# Patient Record
Sex: FEMALE | Race: WHITE | NOT HISPANIC OR LATINO | Employment: FULL TIME | URBAN - METROPOLITAN AREA
[De-identification: names, ages, dates, MRNs, and addresses within clinical notes are randomized per-mention and may not be internally consistent; named-entity substitution may affect disease eponyms.]

---

## 2023-08-15 ENCOUNTER — APPOINTMENT (OUTPATIENT)
Dept: PRIMARY CARE | Facility: CLINIC | Age: 33
End: 2023-08-15
Payer: COMMERCIAL

## 2023-11-29 ENCOUNTER — APPOINTMENT (OUTPATIENT)
Dept: DERMATOLOGY | Facility: CLINIC | Age: 33
End: 2023-11-29
Payer: COMMERCIAL

## 2023-12-16 DIAGNOSIS — K21.9 GASTROESOPHAGEAL REFLUX DISEASE WITHOUT ESOPHAGITIS: Primary | ICD-10-CM

## 2023-12-20 RX ORDER — PANTOPRAZOLE SODIUM 40 MG/1
40 TABLET, DELAYED RELEASE ORAL DAILY
Qty: 30 TABLET | Refills: 1 | Status: SHIPPED | OUTPATIENT
Start: 2023-12-20 | End: 2024-01-25 | Stop reason: SDUPTHER

## 2024-01-10 NOTE — PROGRESS NOTES
Subjective     HPI  Ms. Antonio is a 34 year old female who presents for follow up for PCOS and an elevated prolactin level.    She currently has an URTI from 12/20.  She is currently on an antibiotic.       The patient sates that menarche was in the 8th grade. She cannot recall if menses were irregular from the onset, but she does state that they where irregular in high school and college. She can go for up to three months without a menstrual bleed.      She admits to having hirsutism. She can remove hair daily. She describes it as being black and coarse. She denies any voice changes.     She has been ahving acne, worse prior to her period.       Her sister has the MTHFR gene mutation.       LMP 1/5; menses are regular      Current Regimen  Cabergoline 0.25mg once weekly on Saturdays      She had an MRI in October 2020 which revealed a partially empty sella.         Symptoms are as listed below:  Weight - lost 8 pounds in one week via dietary modifications and row machine; she just purchased a treadmill  Energy - fatigued due to lack of exercise and poor diet  Sleep - difficulty chronically; hard to fall asleep; frequent awakenings.  Temperature intolerances - denies   Bowel movements - has IBS-D; sees GI on 1/24  Skin changes -chronic acne; she has rosacea; sees Dermatology   Hair changes - getting more grey hair    Headaches - admits due to computer screen all day   Vision - denies changes; last eye appt was last week   Cramps - denies   Mood - denies changes        Objective   None given nature of visit today     Assessment/Plan   34 -year-old female presents for follow up for PCOS and an elevated prolactin level.     Elevated prolactin level  To obtain blood tests 2-3 weeks after resolution of current upper respiratory tract infection  To continue Cabergoline 0.25mg po once weekly     Empty sella (CMS/HCC)  To obtain pituitary panel     PCOS (polycystic ovarian syndrome)  To obtain free and total testosterone  values     For follow up in 12 months

## 2024-01-10 NOTE — PATIENT INSTRUCTIONS
Thank you for choosing [facility]  for your health care needs.  If you have any questions, concerns or medical needs, please feel free to contact our office at [phone].    Please ensure you complete your blood work one week before the next scheduled appointment.    To obtain blood tests 2-3 weeks after resolution of current upper respiratory tract infection  To continue 0.25mg once weekly   Will follow up with results   For follow up in 12 months

## 2024-01-11 ENCOUNTER — TELEMEDICINE (OUTPATIENT)
Dept: ENDOCRINOLOGY | Facility: CLINIC | Age: 34
End: 2024-01-11
Payer: COMMERCIAL

## 2024-01-11 VITALS — BODY MASS INDEX: 40.58 KG/M2 | HEIGHT: 69 IN | WEIGHT: 274 LBS

## 2024-01-11 DIAGNOSIS — R79.89 ELEVATED PROLACTIN LEVEL: Primary | ICD-10-CM

## 2024-01-11 DIAGNOSIS — E28.2 POLYCYSTIC OVARIAN SYNDROME: ICD-10-CM

## 2024-01-11 DIAGNOSIS — E23.6 EMPTY SELLA (MULTI): ICD-10-CM

## 2024-01-11 PROCEDURE — 99442 PR PHYS/QHP TELEPHONE EVALUATION 11-20 MIN: CPT | Performed by: INTERNAL MEDICINE

## 2024-01-11 RX ORDER — AMOXICILLIN 250 MG/1
CAPSULE ORAL
COMMUNITY
End: 2024-01-25 | Stop reason: WASHOUT

## 2024-01-11 RX ORDER — CABERGOLINE 0.5 MG/1
TABLET ORAL
COMMUNITY

## 2024-01-15 PROBLEM — E23.6 EMPTY SELLA (MULTI): Status: ACTIVE | Noted: 2024-01-15

## 2024-01-15 PROBLEM — E28.2 PCOS (POLYCYSTIC OVARIAN SYNDROME): Status: ACTIVE | Noted: 2024-01-15

## 2024-01-15 PROBLEM — R79.89 ELEVATED PROLACTIN LEVEL: Status: ACTIVE | Noted: 2024-01-15

## 2024-01-15 NOTE — ASSESSMENT & PLAN NOTE
To obtain blood tests 2-3 weeks after resolution of current upper respiratory tract infection  To continue Cabergoline 0.25mg po once weekly

## 2024-01-25 ENCOUNTER — OFFICE VISIT (OUTPATIENT)
Dept: GASTROENTEROLOGY | Facility: CLINIC | Age: 34
End: 2024-01-25
Payer: COMMERCIAL

## 2024-01-25 VITALS
WEIGHT: 280.2 LBS | DIASTOLIC BLOOD PRESSURE: 98 MMHG | BODY MASS INDEX: 41.5 KG/M2 | OXYGEN SATURATION: 98 % | HEIGHT: 69 IN | HEART RATE: 69 BPM | SYSTOLIC BLOOD PRESSURE: 142 MMHG

## 2024-01-25 DIAGNOSIS — R11.0 NAUSEA: ICD-10-CM

## 2024-01-25 DIAGNOSIS — K21.9 GASTROESOPHAGEAL REFLUX DISEASE WITHOUT ESOPHAGITIS: ICD-10-CM

## 2024-01-25 DIAGNOSIS — R19.7 DIARRHEA, UNSPECIFIED TYPE: Primary | ICD-10-CM

## 2024-01-25 PROCEDURE — 99214 OFFICE O/P EST MOD 30 MIN: CPT | Performed by: NURSE PRACTITIONER

## 2024-01-25 PROCEDURE — 1036F TOBACCO NON-USER: CPT | Performed by: NURSE PRACTITIONER

## 2024-01-25 RX ORDER — LOPERAMIDE HYDROCHLORIDE 2 MG/1
2 CAPSULE ORAL 4 TIMES DAILY PRN
Qty: 60 CAPSULE | Refills: 1 | Status: SHIPPED | OUTPATIENT
Start: 2024-01-25

## 2024-01-25 RX ORDER — BISMUTH SUBSALICYLATE 262 MG
1 TABLET,CHEWABLE ORAL DAILY
COMMUNITY

## 2024-01-25 RX ORDER — NAPROXEN 375 MG/1
375 TABLET ORAL 2 TIMES DAILY
COMMUNITY
Start: 2021-09-16

## 2024-01-25 RX ORDER — PANTOPRAZOLE SODIUM 40 MG/1
40 TABLET, DELAYED RELEASE ORAL DAILY
Qty: 30 TABLET | Refills: 2 | Status: SHIPPED | OUTPATIENT
Start: 2024-01-25 | End: 2024-05-22

## 2024-01-25 RX ORDER — ONDANSETRON 4 MG/1
4 TABLET, FILM COATED ORAL EVERY 8 HOURS PRN
Qty: 15 TABLET | Refills: 1 | Status: SHIPPED | OUTPATIENT
Start: 2024-01-25

## 2024-01-25 RX ORDER — DICYCLOMINE HYDROCHLORIDE 10 MG/1
10 CAPSULE ORAL AS NEEDED
Qty: 60 CAPSULE | Refills: 1 | Status: SHIPPED | OUTPATIENT
Start: 2024-01-25

## 2024-01-25 ASSESSMENT — ENCOUNTER SYMPTOMS
FEVER: 0
COUGH: 0
DIZZINESS: 0
PALPITATIONS: 0
SHORTNESS OF BREATH: 0
CHILLS: 0
SORE THROAT: 0
TROUBLE SWALLOWING: 0
DIARRHEA: 1
BRUISES/BLEEDS EASILY: 0
WEAKNESS: 0
ROS GI COMMENTS: SEE HPI
NAUSEA: 1
JOINT SWELLING: 0
ADENOPATHY: 0
CONFUSION: 0
ARTHRALGIAS: 0
DIFFICULTY URINATING: 0
WOUND: 0

## 2024-01-25 NOTE — PROGRESS NOTES
Subjective   Patient ID: Nazia Antonio is a 34 y.o. female with PMH of GERD, IBS, obesity, and PCOS who presents for Follow-up (Medication refill).     Patient's PCP is MARY Jiang-HANH     HPI    Patient initially seen in May 2021 for GERD and diarrhea. Cdiff and stool PCR negative. Pancreatic elastase, TTG, CRP all WNL. EGD and colonoscopy on 6/25/2021 showed mild gastritis but were otherwise normal. Colonoscopy 6/25/2021 showed a normal colon; random biopsies negative for colitis. Reflux improved with pantoprazole 40mg daily.     She continues to have diarrhea about 5 times per day. She has tried watching her diet and avoids fatty foods but it does not seem to help. No particular triggering foods. No blood in stool and no melena. She does get nausea sometimes and has some abdominal pain/cramping. She otherwise denies unintended weight loss, vomiting, dysphagia, or constipation.     Reflux is well controlled with pantoprazole 40mg daily. She would like refills.       Summary of endoscopies:  - EGD 6/2021: normal esophagus, mild gastritis, normal duodenum. Duodenal biopsies negative for histopathologic change.   - Colonoscopy 6/2021: normal colon; random biopsies negative for colitis.     Social Hx:  Tobacco: none  Etoh: socially   Recreational drug use: occasional marijuana  NSAIDs: ibuprofen once a week       Family Hx:  maternal aunt -- crohns disease   No GI malignancy or pancreatitis     Review of Systems:  Review of Systems   Constitutional:  Negative for chills and fever.   HENT:  Negative for sore throat and trouble swallowing.    Respiratory:  Negative for cough and shortness of breath.    Cardiovascular:  Negative for chest pain and palpitations.   Gastrointestinal:  Positive for diarrhea and nausea.        SEE HPI   Endocrine: Negative for cold intolerance and heat intolerance.   Genitourinary:  Negative for difficulty urinating.   Musculoskeletal:  Negative for arthralgias and joint  swelling.   Skin:  Negative for rash and wound.   Neurological:  Negative for dizziness and weakness.   Hematological:  Negative for adenopathy. Does not bruise/bleed easily.   Psychiatric/Behavioral:  Negative for confusion.         Medications:  Prior to Admission medications    Medication Sig Start Date End Date Taking? Authorizing Provider   cabergoline (Dostinex) 0.5 mg tablet TAKE 1/2 TABLET BY MOUTH ONCE A WEEK AS DIRECTED   Yes Historical Provider, MD   naproxen (Naprosyn) 375 mg tablet Take 1 tablet (375 mg) by mouth 2 times a day. 9/16/21  Yes Historical Provider, MD   pantoprazole (ProtoNix) 40 mg EC tablet TAKE ONE TABLET BY MOUTH EVERY DAY 12/20/23  Yes MARY Blanco-CNP   multivitamin tablet Take 1 tablet by mouth once daily.    Historical Provider, MD   amoxicillin (Amoxil) 250 mg capsule Take by mouth.  1/25/24  Historical Provider, MD       Allergies:  Patient has no known allergies.    Past Medical History:  She has a past medical history of GERD (gastroesophageal reflux disease), Irritable bowel syndrome, and Other conditions influencing health status.    Past Surgical History:  She has a past surgical history that includes Other surgical history (08/18/2020); Other surgical history (04/12/2021); and Other surgical history (09/25/2020).    Social History:  She reports that she has never smoked. She has never been exposed to tobacco smoke. She has never used smokeless tobacco. She reports that she does not currently use alcohol. She reports that she does not use drugs.    Objective   Physical exam:  Physical Exam  Constitutional:       General: She is not in acute distress.     Appearance: Normal appearance.   HENT:      Mouth/Throat:      Mouth: Mucous membranes are moist.      Comments: pink  Eyes:      Conjunctiva/sclera: Conjunctivae normal.      Pupils: Pupils are equal, round, and reactive to light.   Cardiovascular:      Rate and Rhythm: Normal rate and regular rhythm.      Heart  sounds: No murmur heard.  Pulmonary:      Effort: Pulmonary effort is normal.      Breath sounds: Normal breath sounds.   Abdominal:      General: Bowel sounds are normal. There is no distension.      Palpations: Abdomen is soft.      Tenderness: There is no abdominal tenderness. There is no guarding.   Skin:     General: Skin is warm and dry.      Coloration: Skin is not jaundiced.   Neurological:      Mental Status: She is alert and oriented to person, place, and time.   Psychiatric:         Mood and Affect: Mood normal.         Behavior: Behavior normal.          Assessment/Plan     Reflux   Well controlled with pantoprazole 40mg daily. Have sent refills. Does have some nausea at times; zofran sent.     2. Diarrhea  Ongoing for years. 5 BMs per day. Lab work and colonoscopy have previously been unremarkable. Will send for hydrogen breath test to check for SIBO. Sent imodium, dicyclomine for symptom management. Will likely plan for xifaxan.          Kerrie Stinson, APRN-CNP

## 2024-01-25 NOTE — PATIENT INSTRUCTIONS
Thank you for coming to your appoinment today   - I have refilled the pantoprazole   - I sent zofran to use as needed for nausea  - I sent Loperamide to use for diarrhea  - I sent Dicyclomine to use for cramping   - Schedule your hydrogen breath test; call [phone]    Please call [phone] with any questions or concerns       If you utilize [employer] messages, please understand that these are intended to be used for simple and straightforward medical questions. If you have a more complex question or numerous complaints, an office appointment may be needed.

## 2024-01-29 ENCOUNTER — TELEPHONE (OUTPATIENT)
Dept: ENDOCRINOLOGY | Facility: CLINIC | Age: 34
End: 2024-01-29
Payer: COMMERCIAL

## 2024-01-29 NOTE — TELEPHONE ENCOUNTER
"Patient called with c/o weight gain and requests lab orders. Patient has been advised of existing orders but wants more. She was not able to provide any specific testing. She states she would like to have lab orders for \"things that may affect weight loss and her hormones\". Please advise.  "

## 2024-01-30 ENCOUNTER — OFFICE VISIT (OUTPATIENT)
Dept: PRIMARY CARE | Facility: CLINIC | Age: 34
End: 2024-01-30
Payer: COMMERCIAL

## 2024-01-30 VITALS
BODY MASS INDEX: 40.88 KG/M2 | WEIGHT: 276 LBS | DIASTOLIC BLOOD PRESSURE: 76 MMHG | RESPIRATION RATE: 14 BRPM | HEART RATE: 68 BPM | SYSTOLIC BLOOD PRESSURE: 124 MMHG | HEIGHT: 69 IN

## 2024-01-30 DIAGNOSIS — Z00.00 ROUTINE GENERAL MEDICAL EXAMINATION AT A HEALTH CARE FACILITY: Primary | ICD-10-CM

## 2024-01-30 PROBLEM — K21.9 GERD (GASTROESOPHAGEAL REFLUX DISEASE): Status: ACTIVE | Noted: 2024-01-30

## 2024-01-30 PROBLEM — L71.9 ROSACEA, UNSPECIFIED: Status: ACTIVE | Noted: 2022-06-08

## 2024-01-30 PROBLEM — E66.01 MORBID OBESITY (MULTI): Status: ACTIVE | Noted: 2019-06-26

## 2024-01-30 PROBLEM — K58.0 IRRITABLE BOWEL SYNDROME WITH DIARRHEA: Status: ACTIVE | Noted: 2024-01-30

## 2024-01-30 PROBLEM — N92.6 IRREGULAR MENSES: Status: ACTIVE | Noted: 2024-01-30

## 2024-01-30 PROCEDURE — 99395 PREV VISIT EST AGE 18-39: CPT | Performed by: FAMILY MEDICINE

## 2024-01-30 PROCEDURE — 1036F TOBACCO NON-USER: CPT | Performed by: FAMILY MEDICINE

## 2024-01-30 NOTE — PROGRESS NOTES
Subjective   Patient ID: Nazia Antonio is a 34 y.o. female who presents for No chief complaint on file..    HPI     Review of Systems    Objective   There were no vitals taken for this visit.    Physical Exam    Assessment/Plan   {Assess/PlanSmartLinks:81357}

## 2024-01-30 NOTE — PROGRESS NOTES
"Subjective   Patient ID: Nazia Antonio is a 34 y.o. female who presents for No chief complaint on file..    HPI     Review of Systems    Objective   /76   Pulse 68   Resp 14   Ht 1.753 m (5' 9\")   Wt 125 kg (276 lb)   BMI 40.76 kg/m²     Physical Exam    Assessment/Plan   {Assess/PlanSmartLinks:68269}       "

## 2024-01-30 NOTE — PROGRESS NOTES
pt has regular dental visits. no vision problems. no hearing loss.   Lifestyle: healthy diet. + weight concerns. Pt exercises regularly. no tobacco or alcohol abuse.   Safety elements used: seat belt, safe driving habits and smoke detector.   No passive smoke exposure, chemical abuse, domestic violence, anxiety symptoms, depression symptoms.  Pt has safe sexual behavior, safe driving habits. No driving violations, history of DUI.  No tuberculosis exposure.   Reproductive health: the patient is premenopausal. she reports normal menses. she uses no contraception. she is sexually active.   Cervical cancer screening:. patient has no history of an abnormal pap smear.   Review of Systems  Constitutional: no chills, no fever and no night sweats.   Eyes: no blurred vision and no eyesight problems.   ENT: no hearing loss, no nasal congestion, no nasal discharge, no hoarseness and no sore throat.   Neck: no mass(es) and no swelling.   Cardiovascular: no chest pain, no intermittent leg claudication, no lower extremity edema, no palpitations and no syncope.   Respiratory: no cough, no shortness of breath during exertion, no shortness of breath at rest and no wheezing.   Gastrointestinal: no abdominal pain, no blood in stools, no constipation, + diarrhea, no melena, no nausea, no rectal pain and no vomiting.   Genitourinary: no unexplained vaginal bleeding, no dysuria, no change in urinary frequency, no genital lesions, no hematuria, no urinary hesitancy, no incontinence, no pelvic pain, no feelings of urinary urgency and no vaginal discharge.   Musculoskeletal: no arthralgias, no back pain, no localized joint pain, no myalgias and no neck pain.   Integumentary: no new skin lesions, no nipple discharge, no rashes and no skin wound.   Neurological: no confusion, no convulsions, no difficulty walking, no headache, no limb weakness, no memory changes, no numbness, no speech difficulties, no syncope and no tingling. rsl at  night  Psychiatric: no anxiety, no personality change, no depression, no emotional problems, no homicidal thoughts, no anhedonia, no sleep disturbances and no substance use disorders.   Endocrine: no changes in appetite, no deepening of the voice, no polyuria, no feelings of weakness, no heat/cold intolerance, no muscle weakness, no polydipsia, no recent weight gain and no recent weight loss.   Hematologic/Lymphatic: no tendency for easy bleeding, no tendency for easy bruising, no recurrent infections and no swollen glands.     Physical Exam  Constitutional: Alert and in no acute distress. Well developed, well nourished.   Head and Face: Head and face: Normal.  Palpation of the face and sinuses: Normal.    Eyes: Normal external exam. Pupils: PERRL with normal accommodation and EOMI.   Ears, Nose, Mouth, and Throat: External inspection of ears and nose: Normal.  Hearing: Normal.  Nasal mucosa, septum, and turbinates: Normal.  Oropharynx: Normal.    Neck: No neck mass was observed. Supple. Thyroid not enlarged and there were no palpable thyroid nodules.   Cardiovascular: Heart rate and rhythm were normal, normal S1 and S2, no gallops, no murmurs and no pericardial rub. Pedal pulses: Normal. No peripheral edema.   Pulmonary: No respiratory distress. Clear bilateral breath sounds.   Chest wall: Normal.    Abdomen: Soft nontender; no abdominal mass palpated. No organomegaly. No hernias.   Musculoskeletal: Gait and station: Normal. No joint swelling seen, normal movements of all extremities. Range of motion: Normal.  Muscle strength/tone: Normal.    Skin: Normal skin color and pigmentation, normal skin turgor, and no rash. Palpation of skin and subcutaneous tissue: Normal.    Neurologic: Cranial nerves 2-12 grossly intact. Deep tendon reflexes were 2+ and symmetric at the knees. Sensation: Normal. Coordination: Normal.    Psychiatric: Judgment and insight: Intact. Alert and oriented x 3. Recent and remote memory: Normal.   Mood and affect: Normal.   Lymphatic: No cervical lymphadenopathy. Palpation of lymph nodes in axillae: Normal.      unremarkable PE except morbid obesity. recommend nutritionist eval. Recommend DASH diet and regular exercise. check CBC, CMP, lipid, TSH.  Advise eye exam by an OD yearly and dental exam every 6 months. will monitor lipid and weight yearly.  recommend Hep C, hepB, HIV test. recommend   Tdap, hepB, flu, covid shot. We will call pt regarding lab results. f/u as scheduled.  Recommend  pap smear. Pt prefers to see her Gynecologist for evaluation.

## 2024-01-31 ENCOUNTER — LAB (OUTPATIENT)
Dept: LAB | Facility: LAB | Age: 34
End: 2024-01-31
Payer: COMMERCIAL

## 2024-01-31 DIAGNOSIS — E28.2 POLYCYSTIC OVARIAN SYNDROME: ICD-10-CM

## 2024-01-31 DIAGNOSIS — E23.6 EMPTY SELLA (MULTI): ICD-10-CM

## 2024-01-31 DIAGNOSIS — Z00.00 ROUTINE GENERAL MEDICAL EXAMINATION AT A HEALTH CARE FACILITY: ICD-10-CM

## 2024-01-31 DIAGNOSIS — R79.89 ELEVATED PROLACTIN LEVEL: ICD-10-CM

## 2024-01-31 LAB
ALBUMIN SERPL BCP-MCNC: 4.3 G/DL (ref 3.4–5)
ALP SERPL-CCNC: 52 U/L (ref 33–110)
ALT SERPL W P-5'-P-CCNC: 44 U/L (ref 7–45)
AMORPH CRY #/AREA UR COMP ASSIST: ABNORMAL /HPF
ANION GAP SERPL CALC-SCNC: 14 MMOL/L (ref 10–20)
APPEARANCE UR: ABNORMAL
AST SERPL W P-5'-P-CCNC: 34 U/L (ref 9–39)
BILIRUB SERPL-MCNC: 0.7 MG/DL (ref 0–1.2)
BILIRUB UR STRIP.AUTO-MCNC: NEGATIVE MG/DL
BUN SERPL-MCNC: 14 MG/DL (ref 6–23)
CALCIUM SERPL-MCNC: 9 MG/DL (ref 8.6–10.3)
CHLORIDE SERPL-SCNC: 105 MMOL/L (ref 98–107)
CHOLEST SERPL-MCNC: 167 MG/DL (ref 0–199)
CHOLESTEROL/HDL RATIO: 3.7
CO2 SERPL-SCNC: 25 MMOL/L (ref 21–32)
COLOR UR: YELLOW
CORTIS SERPL-MCNC: 26.3 UG/DL (ref 2.5–20)
CREAT SERPL-MCNC: 0.92 MG/DL (ref 0.5–1.05)
DHEA-S SERPL-MCNC: 263 UG/DL (ref 12–379)
EGFRCR SERPLBLD CKD-EPI 2021: 83 ML/MIN/1.73M*2
ERYTHROCYTE [DISTWIDTH] IN BLOOD BY AUTOMATED COUNT: 12.9 % (ref 11.5–14.5)
EST. AVERAGE GLUCOSE BLD GHB EST-MCNC: 100 MG/DL
FSH SERPL-ACNC: 3.9 IU/L
GLUCOSE SERPL-MCNC: 82 MG/DL (ref 74–99)
GLUCOSE UR STRIP.AUTO-MCNC: NEGATIVE MG/DL
HBA1C MFR BLD: 5.1 %
HCT VFR BLD AUTO: 41.9 % (ref 36–46)
HDLC SERPL-MCNC: 45.1 MG/DL
HGB BLD-MCNC: 13.9 G/DL (ref 12–16)
KETONES UR STRIP.AUTO-MCNC: NEGATIVE MG/DL
LDLC SERPL CALC-MCNC: 102 MG/DL
LEUKOCYTE ESTERASE UR QL STRIP.AUTO: ABNORMAL
LH SERPL-ACNC: 10.1 IU/L
MCH RBC QN AUTO: 30.2 PG (ref 26–34)
MCHC RBC AUTO-ENTMCNC: 33.2 G/DL (ref 32–36)
MCV RBC AUTO: 91 FL (ref 80–100)
MUCOUS THREADS #/AREA URNS AUTO: ABNORMAL /LPF
NITRITE UR QL STRIP.AUTO: NEGATIVE
NON HDL CHOLESTEROL: 122 MG/DL (ref 0–149)
NRBC BLD-RTO: 0 /100 WBCS (ref 0–0)
PH UR STRIP.AUTO: 5 [PH]
PLATELET # BLD AUTO: 249 X10*3/UL (ref 150–450)
POTASSIUM SERPL-SCNC: 4.3 MMOL/L (ref 3.5–5.3)
PROLACTIN SERPL-MCNC: 5.1 UG/L (ref 3–20)
PROT SERPL-MCNC: 7.5 G/DL (ref 6.4–8.2)
PROT UR STRIP.AUTO-MCNC: NEGATIVE MG/DL
RBC # BLD AUTO: 4.6 X10*6/UL (ref 4–5.2)
RBC # UR STRIP.AUTO: NEGATIVE /UL
RBC #/AREA URNS AUTO: ABNORMAL /HPF
SODIUM SERPL-SCNC: 140 MMOL/L (ref 136–145)
SP GR UR STRIP.AUTO: 1.02
SQUAMOUS #/AREA URNS AUTO: ABNORMAL /HPF
TRIGL SERPL-MCNC: 102 MG/DL (ref 0–149)
TSH SERPL-ACNC: 2.14 MIU/L (ref 0.44–3.98)
UROBILINOGEN UR STRIP.AUTO-MCNC: <2 MG/DL
VLDL: 20 MG/DL (ref 0–40)
WBC # BLD AUTO: 7.1 X10*3/UL (ref 4.4–11.3)
WBC #/AREA URNS AUTO: ABNORMAL /HPF

## 2024-01-31 PROCEDURE — 83036 HEMOGLOBIN GLYCOSYLATED A1C: CPT

## 2024-01-31 PROCEDURE — 80061 LIPID PANEL: CPT

## 2024-01-31 PROCEDURE — 84402 ASSAY OF FREE TESTOSTERONE: CPT

## 2024-01-31 PROCEDURE — 83001 ASSAY OF GONADOTROPIN (FSH): CPT

## 2024-01-31 PROCEDURE — 84305 ASSAY OF SOMATOMEDIN: CPT

## 2024-01-31 PROCEDURE — 82681 ASSAY DIR MEAS FR ESTRADIOL: CPT

## 2024-01-31 PROCEDURE — 85027 COMPLETE CBC AUTOMATED: CPT

## 2024-01-31 PROCEDURE — 81001 URINALYSIS AUTO W/SCOPE: CPT

## 2024-01-31 PROCEDURE — 83002 ASSAY OF GONADOTROPIN (LH): CPT

## 2024-01-31 PROCEDURE — 82533 TOTAL CORTISOL: CPT

## 2024-01-31 PROCEDURE — 80053 COMPREHEN METABOLIC PANEL: CPT

## 2024-01-31 PROCEDURE — 82670 ASSAY OF TOTAL ESTRADIOL: CPT

## 2024-01-31 PROCEDURE — 82024 ASSAY OF ACTH: CPT

## 2024-01-31 PROCEDURE — 36415 COLL VENOUS BLD VENIPUNCTURE: CPT

## 2024-01-31 PROCEDURE — 84443 ASSAY THYROID STIM HORMONE: CPT

## 2024-01-31 PROCEDURE — 84146 ASSAY OF PROLACTIN: CPT

## 2024-01-31 PROCEDURE — 82627 DEHYDROEPIANDROSTERONE: CPT

## 2024-02-01 LAB — ACTH PLAS-MCNC: 82.3 PG/ML (ref 7.2–63.3)

## 2024-02-02 LAB
IGF-I SERPL-MCNC: 109 NG/ML (ref 81–278)
IGF-I Z-SCORE SERPL: -1.3

## 2024-02-04 LAB
TESTOSTERONE FREE (CHAN): 4.1 PG/ML (ref 0.1–6.4)
TESTOSTERONE,TOTAL,LC-MS/MS: 22 NG/DL (ref 2–45)

## 2024-02-07 ENCOUNTER — OFFICE VISIT (OUTPATIENT)
Dept: PRIMARY CARE | Facility: CLINIC | Age: 34
End: 2024-02-07
Payer: COMMERCIAL

## 2024-02-07 DIAGNOSIS — R31.21 ASYMPTOMATIC MICROSCOPIC HEMATURIA: Primary | ICD-10-CM

## 2024-02-07 PROCEDURE — 99213 OFFICE O/P EST LOW 20 MIN: CPT | Performed by: FAMILY MEDICINE

## 2024-02-07 PROCEDURE — 1036F TOBACCO NON-USER: CPT | Performed by: FAMILY MEDICINE

## 2024-02-07 NOTE — PROGRESS NOTES
pt denies  low abd tenderness, dysuria, urinary frequency and urgency, hematuria, flank pain, fever or chills. No nausea, vomiting. Recent UA showed blood and wbc.   Will check urine cx for eval

## 2024-02-08 ENCOUNTER — TELEPHONE (OUTPATIENT)
Dept: ENDOCRINOLOGY | Facility: CLINIC | Age: 34
End: 2024-02-08
Payer: COMMERCIAL

## 2024-02-13 NOTE — TELEPHONE ENCOUNTER
Patient called again to check status of labs. She also sent my chart message. She is concerned of elevated levels and would like to know Dr. English' recommendations as soon as possible. She expressed her dissatisfaction that it has been two weeks with no response. Please advise.

## 2024-02-14 ENCOUNTER — LAB (OUTPATIENT)
Dept: LAB | Facility: LAB | Age: 34
End: 2024-02-14
Payer: COMMERCIAL

## 2024-02-14 DIAGNOSIS — R79.89 ELEVATED CORTISOL LEVEL: Primary | ICD-10-CM

## 2024-02-14 DIAGNOSIS — R31.21 ASYMPTOMATIC MICROSCOPIC HEMATURIA: ICD-10-CM

## 2024-02-14 PROCEDURE — 87086 URINE CULTURE/COLONY COUNT: CPT

## 2024-02-14 NOTE — RESULT ENCOUNTER NOTE
Labs that have been resulted have been reviewed; two values are still in process.  All values are within normal limits with the exception of the ACTH and cortisol values.  There is no suppression of the DHEAS values to suggest Cushing's.  However, please undergo 24 hour urinary collection to assess further.  The first urine of the day is flushed and then all urine is placed in the container which can be obtained from the lab.  The urine must be kept cold in this period of 24 hour period.

## 2024-02-15 DIAGNOSIS — R31.21 ASYMPTOMATIC MICROSCOPIC HEMATURIA: Primary | ICD-10-CM

## 2024-02-15 LAB — BACTERIA UR CULT: NORMAL

## 2024-02-16 ENCOUNTER — LAB (OUTPATIENT)
Dept: LAB | Facility: LAB | Age: 34
End: 2024-02-16
Payer: COMMERCIAL

## 2024-02-16 DIAGNOSIS — R79.89 ELEVATED CORTISOL LEVEL: ICD-10-CM

## 2024-02-16 PROCEDURE — 81050 URINALYSIS VOLUME MEASURE: CPT

## 2024-02-16 PROCEDURE — 82570 ASSAY OF URINE CREATININE: CPT

## 2024-02-16 PROCEDURE — 82530 CORTISOL FREE: CPT

## 2024-02-17 LAB
COLLECT DURATION TIME SPEC: 24 HRS
CREAT 24H UR-MCNC: 67.6 MG/DL (ref 20–320)
CREAT 24H UR-MRATE: 2.03 G/24 H (ref 0.67–1.59)
SPECIMEN VOL 24H UR: 3000 ML

## 2024-02-20 LAB
ANNOTATION COMMENT IMP: ABNORMAL
COLLECT DURATION TIME SPEC: 24 HR
CORTIS F 24H UR HPLC-MCNC: 3.96 UG/L
CORTIS F 24H UR-MRATE: 11.9 UG/D
CORTIS F/CREAT 24H UR: 5.82 UG/G CRT
CREAT 24H UR-MRATE: 2040 MG/D (ref 700–1600)
CREAT UR-MCNC: 68 MG/DL
SPECIMEN VOL ?TM UR: 3000 ML

## 2024-02-23 ENCOUNTER — PROCEDURE VISIT (OUTPATIENT)
Dept: GASTROENTEROLOGY | Facility: CLINIC | Age: 34
End: 2024-02-23
Payer: COMMERCIAL

## 2024-02-23 DIAGNOSIS — R19.7 DIARRHEA, UNSPECIFIED TYPE: ICD-10-CM

## 2024-02-23 PROCEDURE — 91065 BREATH HYDROGEN/METHANE TEST: CPT | Performed by: NURSE PRACTITIONER

## 2024-02-23 NOTE — PROGRESS NOTES
Patient ID: Nazia Antonio is a 34 y.o. female.    Breath Hydrogen Test-Bacterial Overgrowth    Date/Time: 2/23/2024 10:23 AM    Performed by: Mary Brooks RN  Authorized by: JEFFERSON Blanco    Universal protocol:     Patient identity confirmed:  Verbally with patient  Sedation:     Sedation type:  None  Post-procedure details:     Procedure completion:  Tolerated  Hydrogen Breath Analysis Consultation Sheet    Referring Provider: JEFFERSON Blanco  [address] [facility] [address]  [place] [address]    Indication: Diarrhea    Age: 34 y.o.  Weight: There were no vitals filed for this visit.  Substrate: 75 GRAMS DEXTROSE    Last Meal: 1915  Recent Antibiotics: Denies    RESULTS:   Time PPM (H2) APPM* (CH4) CO2 Correction   Baseline #1 0835 2 1 4.9 1.12   Baseline #2 0840 2 1 5.0 1.10   *Challenge Dose Sugar: 0845  15' 0900 7 3 5.3 1.03   30' 0915 6 3 5.3 1.03   45' 0930 5 4 5.9 0.94   60' 0945 4 4 5.4 1.03   75' 1000 3 1 5.5 1.00   90' 1015 2 1 5.0 1.10   105'        120'        135'        150'        165'        180'          Impression: Negative for SIBO and methane

## 2024-02-25 LAB
ESTRADIOL (SJC): 146 PG/ML
ESTRADIOL FREE: NORMAL PG/ML

## 2024-02-26 NOTE — RESULT ENCOUNTER NOTE
Labs have been reviewed.  There is no evidence of cortisol excess on urinary testing.  For follow up as scheduled.

## 2024-02-28 ENCOUNTER — OFFICE VISIT (OUTPATIENT)
Dept: UROLOGY | Facility: CLINIC | Age: 34
End: 2024-02-28
Payer: COMMERCIAL

## 2024-02-28 VITALS — DIASTOLIC BLOOD PRESSURE: 78 MMHG | SYSTOLIC BLOOD PRESSURE: 124 MMHG | HEART RATE: 86 BPM

## 2024-02-28 DIAGNOSIS — R31.29 MICROHEMATURIA: Primary | ICD-10-CM

## 2024-02-28 LAB
POC BILIRUBIN, URINE: NEGATIVE
POC BLOOD, URINE: NEGATIVE
POC GLUCOSE, URINE: NEGATIVE MG/DL
POC KETONES, URINE: NEGATIVE MG/DL
POC LEUKOCYTES, URINE: ABNORMAL
POC NITRITE,URINE: NEGATIVE
POC PH, URINE: 5.5 PH
POC PROTEIN, URINE: NEGATIVE MG/DL
POC SPECIFIC GRAVITY, URINE: 1.02
POC UROBILINOGEN, URINE: 0.2 EU/DL

## 2024-02-28 PROCEDURE — 51798 US URINE CAPACITY MEASURE: CPT

## 2024-02-28 PROCEDURE — 81003 URINALYSIS AUTO W/O SCOPE: CPT

## 2024-02-28 PROCEDURE — 1036F TOBACCO NON-USER: CPT

## 2024-02-28 PROCEDURE — 99204 OFFICE O/P NEW MOD 45 MIN: CPT

## 2024-02-28 PROCEDURE — 2000F BLOOD PRESSURE MEASURE: CPT

## 2024-02-28 NOTE — LETTER
2024     Luis Maya MD PhD  [id number] [facility] [address]    Patient: Nazia Antonio   YOB: 1990   Date of Visit: 2024       Dear Dr. Luis Maya MD PhD:    Thank you for referring Nazia Antonio to me for evaluation. Below are my notes for this consultation.  If you have questions, please do not hesitate to call me. I look forward to following your patient along with you.       Sincerely,     Anabel Mcfarland PA-C      CC: No Recipients  ______________________________________________________________________________________      [facility] [place]  Outpatient Clinic Note    Patient: Nazia Antonio  Age/Sex: 34 y.o., female  MRN: [id number]  Referred by: Dr. Luis Maya    Chief Complaint:  Blood in urine         History of Present Illness  This is a 34 y.o. female,  presenting to the office as a new patient for microhematuria found by her PCP on microscopic urine lab , RBC 11-20 and WBC 21-50. Urine culture  negative for bacterial growth. Her endocrinologist ordered a 24 hour urine Creatinine resulting in elevated levels of 2.03, and cortisol urine resulted in Creatinine elevated as well . The patient denies gross hematuria, dysuria, flank pain, abdominal pain, nausea, vomiting, fever or chills. The patient denies a history of kidney stones or any urological history. The patient reports she has IBS and follows with with Kerrie Stinson CNP. She reports her LMP was 2024 she used to have irregular periods and was diagnosed with PCOS, her endocrinologist prescribed a medication to help her periods be regular. Ovarian cyst removed in  by a physician in [place]. She denies a history of any cancer. She denies ever using tobacco.     Gyn History:  - Menopausal: No           Postmenopausal bleeding: No  - Hysterectomy: No  - Pap up to date: No   History of abnormal pap: No  - Sexually active:  Yes  Dyspareunia: No   Other issues:   - Number of prior  vaginal deliveries: 0  - Number of prior c-sections: 0    - Colonoscopy up to date: Yes - 06/2021      Past Medical & Surgical History  Past Medical History:   Diagnosis Date   • GERD (gastroesophageal reflux disease)    • Irritable bowel syndrome    • Other conditions influencing health status     No significant past medical history     Past Surgical History:   Procedure Laterality Date   • OTHER SURGICAL HISTORY  08/18/2020    Knee surgery   • OTHER SURGICAL HISTORY  04/12/2021    Finger surgical procedure   • OTHER SURGICAL HISTORY  09/25/2020    Cystoscopy       Family History  Family History   Problem Relation Name Age of Onset   • Alcohol abuse Father Akhil    • Cirrhosis Father Akhil    • Cirrhosis Mother's Brother Akhil    • Crohn's disease Mother's Sister Federica        Social History  She reports that she has never smoked. She has never been exposed to tobacco smoke. She has never used smokeless tobacco. She reports that she does not currently use alcohol. She reports that she does not use drugs.    Allergies  Patient has no known allergies.    Medications:  Current Outpatient Medications on File Prior to Visit   Medication Sig Dispense Refill   • cabergoline (Dostinex) 0.5 mg tablet TAKE 1/2 TABLET BY MOUTH ONCE A WEEK AS DIRECTED     • dicyclomine (Bentyl) 10 mg capsule Take 1 capsule (10 mg) by mouth if needed (4 times per day as needed for cramping). Take 1 tablet 4 times per day as needed for cramping 60 capsule 1   • loperamide (Anti-DiarrheaL, loperamide,) 2 mg capsule Take 1 capsule (2 mg) by mouth 4 times a day as needed for diarrhea. 60 capsule 1   • multivitamin tablet Take 1 tablet by mouth once daily.     • naproxen (Naprosyn) 375 mg tablet Take 1 tablet (375 mg) by mouth 2 times a day.     • ondansetron (Zofran) 4 mg tablet Take 1 tablet (4 mg) by mouth every 8 hours if needed for nausea or vomiting. 15 tablet 1   • pantoprazole (ProtoNix) 40 mg EC tablet Take 1 tablet (40 mg) by mouth once  daily. 30 tablet 2     No current facility-administered medications on file prior to visit.        Review of Systems   A comprehensive 10+ review of systems was negative except for: see hpi          Physical Exam                                                                                                                      General: Well developed, well nourished, alert and cooperative, appears in no acute distress  Head: Normocephalic, atraumatic  Neck: supple, trachea midline  Eyes: Non-injected conjunctiva, sclera clear, no proptosis  Cardiac: Extremities are warm and well perfused. No edema, cyanosis or pallor.   Lungs: Breathing is easy, non-labored. Speaking in clear and complete sentences. Normal diaphragmatic movement.  Abdomen: soft, non-distended, non-tender, no rebound or guarding, no hernia and no CVA tenderness   MSK: Ambulatory with steady gait, unassisted  Neuro: alert and oriented to person, place and time  Psych: Demonstrates good judgement and reason, without hallucinations, abnormal affect or abnormal behaviors.  Skin: no obvious lesions, no rashes      PVR (by Ultrasound): 34mL   Urine dip:   Recent Results (from the past 6 hour(s))   POCT UA Automated manually resulted    Collection Time: 02/28/24  8:57 AM   Result Value Ref Range    POC Glucose, Urine NEGATIVE NEGATIVE mg/dl    POC Bilirubin, Urine NEGATIVE NEGATIVE    POC Ketones, Urine NEGATIVE NEGATIVE mg/dl    POC Specific Gravity, Urine 1.025 1.005 - 1.035    POC Blood, Urine NEGATIVE NEGATIVE    POC PH, Urine 5.5 No Reference Range Established PH    POC Protein, Urine NEGATIVE NEGATIVE, 30 (1+) mg/dl    POC Urobilinogen, Urine 0.2 0.2, 1.0 EU/DL    Poc Nitrite, Urine NEGATIVE NEGATIVE    POC Leukocytes, Urine SMALL (1+) (A) NEGATIVE       Labs  Urine Culture  Order: [id number]  Collected 2/14/2024 14:39       Status: Final result       Visible to patient: Yes (seen)       Dx: Asymptomatic microscopic hematuria    Specimen Information:  Clean Catch/Voided; Urine   0 Result Notes       1 Patient Communication  Urine Culture No significant growth           Resulting Agency: [facility]           Specimen Collected: 24 14:39 Last Resulted: 02/15/24 19:41            abnormal data Microscopic Only, Urine  Order: [id number] - Reflex for Order [id number]  Status: Final result       Visible to patient: Yes (seen)       Dx: Routine general medical examination a...    1 Result Note      Component  Ref Range & Units 4 wk ago   WBC, Urine  1-5, NONE /HPF 21-50 Abnormal    RBC, Urine  NONE, 1-2, 3-5 /HPF 11-20 Abnormal    Squamous Epithelial Cells, Urine  Reference range not established. /HPF 1-9 (SPARSE)   Mucus, Urine  Reference range not established. /LPF 4+   Amorphous Crystals, Urine  NONE, 1+, 2+ /HPF 1+   Resulting Agency [facility]        abnormal data Creatinine, urine, 24 hour  Order: [id number]  Status: Final result       Visible to patient: Yes (seen)       Dx: Elevated cortisol level    1 Result Note       1 Patient Communication   important suggestion  Newer results are available. Click to view them now.         Component  Ref Range & Units 12 d ago   Collection period  hrs 24   Urine Volume  mL 3,000   Creatinine, Urine  20.0 - 320.0 mg/dL 67.6   Creatinine, 24 Hour Urine  0.67 - 1.59 g/24 h 2.03 High    Resulting Agency [facility]              Specimen Collected: 24 13:18 Last Resulted: 24 03:58           Imaging  N/A    IMPRESSION AND PLAN:  Nazia Antonio is a 34 y.o.  presenting to the office as a new patient for microhematuria found by her PCP on microscopic urine lab , RBC 11-20 and WBC 21-50. Urine culture  negative for bacterial growth. Her endocrinologist ordered a 24 hour urine Creatinine resulting in elevated levels of 2.03, and cortisol urine resulted in Creatinine elevated as well . The patient denies gross hematuria, dysuria, flank pain, abdominal pain, nausea, vomiting, fever or chills. The patient denies a  history of kidney stones or any urological history. The patient reports she has IBS and follows with with Kerrie Stinson CNP. She reports her LMP was 1/7/2024 she used to have irregular periods and was diagnosed with PCOS, her endocrinologist prescribed a medication to help her periods be regular. Ovarian cyst removed in 2007 by a physician in [place]. She denies a history of any cancer. She denies ever using tobacco.     Microhematuria  -Microscopic urine lab 1/31, RBC 11-20 and WBC 21-50.  -Urine culture 2/14 negative for bacterial growth.  -24 hour urine Creatinine resulting in elevated levels of 2.03, and cortisol urine resulted in Creatinine elevated as well at 2040.  -Discussion with Dr. Harrison- ordering a renal ultrasound   -Cystoscopy scheduled with Dr. Harrison    I will call patient with results of the Renal ultrasound.     Cystoscopy scheduled with Dr. Harrison for  3/22/2024    All questions and concerns were answered and addressed.  The patient expressed understanding and agrees with the plan.     Reviewed and approved by PATTI MADDEN on 2/28/24 at 9:01 AM.

## 2024-02-28 NOTE — PROGRESS NOTES
[facility] [place]  Outpatient Clinic Note    Patient: Nazia Antonio  Age/Sex: 34 y.o., female  MRN: [id number]  Referred by: Dr. Luis Maya    Chief Complaint:  Blood in urine         History of Present Illness  This is a 34 y.o. female,  presenting to the office as a new patient for microhematuria found by her PCP on microscopic urine lab , RBC 11-20 and WBC 21-50. Urine culture  negative for bacterial growth. Her endocrinologist ordered a 24 hour urine Creatinine resulting in elevated levels of 2.03, and cortisol urine resulted in Creatinine elevated as well . The patient denies gross hematuria, dysuria, flank pain, abdominal pain, nausea, vomiting, fever or chills. The patient denies a history of kidney stones or any urological history. The patient reports she has IBS and follows with with Kerrie Stinson CNP. She reports her LMP was 2024 she used to have irregular periods and was diagnosed with PCOS, her endocrinologist prescribed a medication to help her periods be regular. Ovarian cyst removed in  by a physician in [place]. She denies a history of any cancer. She denies ever using tobacco.     Gyn History:  - Menopausal: No           Postmenopausal bleeding: No  - Hysterectomy: No  - Pap up to date: No   History of abnormal pap: No  - Sexually active:  Yes  Dyspareunia: No   Other issues:   - Number of prior vaginal deliveries: 0  - Number of prior c-sections: 0    - Colonoscopy up to date: Yes - 2021      Past Medical & Surgical History  Past Medical History:   Diagnosis Date    GERD (gastroesophageal reflux disease)     Irritable bowel syndrome     Other conditions influencing health status     No significant past medical history     Past Surgical History:   Procedure Laterality Date    OTHER SURGICAL HISTORY  2020    Knee surgery    OTHER SURGICAL HISTORY  2021    Finger surgical procedure    OTHER SURGICAL HISTORY  2020    Cystoscopy       Family  History  Family History   Problem Relation Name Age of Onset    Alcohol abuse Father Akhil     Cirrhosis Father Akhil     Cirrhosis Mother's Brother Akhil     Crohn's disease Mother's Sister Federica        Social History  She reports that she has never smoked. She has never been exposed to tobacco smoke. She has never used smokeless tobacco. She reports that she does not currently use alcohol. She reports that she does not use drugs.    Allergies  Patient has no known allergies.    Medications:  Current Outpatient Medications on File Prior to Visit   Medication Sig Dispense Refill    cabergoline (Dostinex) 0.5 mg tablet TAKE 1/2 TABLET BY MOUTH ONCE A WEEK AS DIRECTED      dicyclomine (Bentyl) 10 mg capsule Take 1 capsule (10 mg) by mouth if needed (4 times per day as needed for cramping). Take 1 tablet 4 times per day as needed for cramping 60 capsule 1    loperamide (Anti-DiarrheaL, loperamide,) 2 mg capsule Take 1 capsule (2 mg) by mouth 4 times a day as needed for diarrhea. 60 capsule 1    multivitamin tablet Take 1 tablet by mouth once daily.      naproxen (Naprosyn) 375 mg tablet Take 1 tablet (375 mg) by mouth 2 times a day.      ondansetron (Zofran) 4 mg tablet Take 1 tablet (4 mg) by mouth every 8 hours if needed for nausea or vomiting. 15 tablet 1    pantoprazole (ProtoNix) 40 mg EC tablet Take 1 tablet (40 mg) by mouth once daily. 30 tablet 2     No current facility-administered medications on file prior to visit.        Review of Systems   A comprehensive 10+ review of systems was negative except for: see hpi          Physical Exam                                                                                                                      General: Well developed, well nourished, alert and cooperative, appears in no acute distress  Head: Normocephalic, atraumatic  Neck: supple, trachea midline  Eyes: Non-injected conjunctiva, sclera clear, no proptosis  Cardiac: Extremities are warm and well  perfused. No edema, cyanosis or pallor.   Lungs: Breathing is easy, non-labored. Speaking in clear and complete sentences. Normal diaphragmatic movement.  Abdomen: soft, non-distended, non-tender, no rebound or guarding, no hernia and no CVA tenderness   MSK: Ambulatory with steady gait, unassisted  Neuro: alert and oriented to person, place and time  Psych: Demonstrates good judgement and reason, without hallucinations, abnormal affect or abnormal behaviors.  Skin: no obvious lesions, no rashes      PVR (by Ultrasound): 34mL   Urine dip:   Recent Results (from the past 6 hour(s))   POCT UA Automated manually resulted    Collection Time: 02/28/24  8:57 AM   Result Value Ref Range    POC Glucose, Urine NEGATIVE NEGATIVE mg/dl    POC Bilirubin, Urine NEGATIVE NEGATIVE    POC Ketones, Urine NEGATIVE NEGATIVE mg/dl    POC Specific Gravity, Urine 1.025 1.005 - 1.035    POC Blood, Urine NEGATIVE NEGATIVE    POC PH, Urine 5.5 No Reference Range Established PH    POC Protein, Urine NEGATIVE NEGATIVE, 30 (1+) mg/dl    POC Urobilinogen, Urine 0.2 0.2, 1.0 EU/DL    Poc Nitrite, Urine NEGATIVE NEGATIVE    POC Leukocytes, Urine SMALL (1+) (A) NEGATIVE       Labs  Urine Culture  Order: [id number]  Collected 2/14/2024 14:39       Status: Final result       Visible to patient: Yes (seen)       Dx: Asymptomatic microscopic hematuria    Specimen Information: Clean Catch/Voided; Urine   0 Result Notes       1 Patient Communication  Urine Culture No significant growth           Resulting Agency: [facility]           Specimen Collected: 02/14/24 14:39 Last Resulted: 02/15/24 19:41            abnormal data Microscopic Only, Urine  Order: [id number] - Reflex for Order [id number]  Status: Final result       Visible to patient: Yes (seen)       Dx: Routine general medical examination a...    1 Result Note      Component  Ref Range & Units 4 wk ago   WBC, Urine  1-5, NONE /HPF 21-50 Abnormal    RBC, Urine  NONE, 1-2, 3-5 /HPF 11-20 Abnormal     Squamous Epithelial Cells, Urine  Reference range not established. /HPF 1-9 (SPARSE)   Mucus, Urine  Reference range not established. /LPF 4+   Amorphous Crystals, Urine  NONE, 1+, 2+ /HPF 1+   Resulting Agency [facility]        abnormal data Creatinine, urine, 24 hour  Order: [id number]  Status: Final result       Visible to patient: Yes (seen)       Dx: Elevated cortisol level    1 Result Note       1 Patient Communication   important suggestion  Newer results are available. Click to view them now.         Component  Ref Range & Units 12 d ago   Collection period  hrs 24   Urine Volume  mL 3,000   Creatinine, Urine  20.0 - 320.0 mg/dL 67.6   Creatinine, 24 Hour Urine  0.67 - 1.59 g/24 h 2.03 High    Resulting Agency [facility]              Specimen Collected: 24 13:18 Last Resulted: 24 03:58           Imaging  N/A    IMPRESSION AND PLAN:  Nazia Antonio is a 34 y.o.  presenting to the office as a new patient for microhematuria found by her PCP on microscopic urine lab , RBC 11-20 and WBC 21-50. Urine culture  negative for bacterial growth. Her endocrinologist ordered a 24 hour urine Creatinine resulting in elevated levels of 2.03, and cortisol urine resulted in Creatinine elevated as well . The patient denies gross hematuria, dysuria, flank pain, abdominal pain, nausea, vomiting, fever or chills. The patient denies a history of kidney stones or any urological history. The patient reports she has IBS and follows with with Kerrie Stinson CNP. She reports her LMP was 2024 she used to have irregular periods and was diagnosed with PCOS, her endocrinologist prescribed a medication to help her periods be regular. Ovarian cyst removed in  by a physician in [place]. She denies a history of any cancer. She denies ever using tobacco.     Microhematuria  -Microscopic urine lab , RBC 11-20 and WBC 21-50.  -Urine culture  negative for bacterial growth.  -24 hour urine Creatinine  resulting in elevated levels of 2.03, and cortisol urine resulted in Creatinine elevated as well at 2040.  -Discussion with Dr. Harrison- ordering a renal ultrasound   -Cystoscopy scheduled with Dr. Harrison    I will call patient with results of the Renal ultrasound.     Cystoscopy scheduled with Dr. Harrison for  3/22/2024    All questions and concerns were answered and addressed.  The patient expressed understanding and agrees with the plan.     Reviewed and approved by PATTI MADDEN on 2/28/24 at 9:01 AM.

## 2024-03-01 ENCOUNTER — HOSPITAL ENCOUNTER (OUTPATIENT)
Dept: RADIOLOGY | Facility: HOSPITAL | Age: 34
Discharge: HOME | End: 2024-03-01
Payer: COMMERCIAL

## 2024-03-01 DIAGNOSIS — R31.29 MICROHEMATURIA: ICD-10-CM

## 2024-03-01 PROCEDURE — 76770 US EXAM ABDO BACK WALL COMP: CPT | Performed by: STUDENT IN AN ORGANIZED HEALTH CARE EDUCATION/TRAINING PROGRAM

## 2024-03-01 PROCEDURE — 76770 US EXAM ABDO BACK WALL COMP: CPT

## 2024-03-04 ENCOUNTER — TELEPHONE (OUTPATIENT)
Dept: ENDOCRINOLOGY | Facility: CLINIC | Age: 34
End: 2024-03-04
Payer: COMMERCIAL

## 2024-03-04 NOTE — TELEPHONE ENCOUNTER
"Patient had viewed her lab results/recommendations on 2/26/24.  She does still have questions on the results and would like a call back to discuss this.  She specifically wants to discuss the lab tests that were showing as \"abnormal\".  "

## 2024-03-04 NOTE — TELEPHONE ENCOUNTER
----- Message from Nazia Antonio sent at 3/4/2024 10:55 AM EST -----  Regarding: Test results   Contact: [phone]  Yes why is those labs high? What’s are the cause of it? What are my next steps? She replied with no explanation no direction of next steps

## 2024-03-04 NOTE — TELEPHONE ENCOUNTER
Patient has been called.  Explained the pulsatile nature of blood tests as it pertains to the cortisol and ACTH and hence the reason why she was asked to do the 24 hour urinary collection.  Original labs were obtained due to history of empty sella. Explained that the 24 hour urinary collection is diagnostic for a state of cortisol excess.    The 24 hour urinary collection was normal.  The elevated creatinine reflects the high volume of urine that was collected. There was no evidence of cortisol excess.      A urology consult was not placed by me for this purpose.      Understand expressed

## 2024-03-22 ENCOUNTER — APPOINTMENT (OUTPATIENT)
Dept: UROLOGY | Facility: CLINIC | Age: 34
End: 2024-03-22
Payer: COMMERCIAL

## 2024-03-26 ENCOUNTER — APPOINTMENT (OUTPATIENT)
Dept: GASTROENTEROLOGY | Facility: CLINIC | Age: 34
End: 2024-03-26
Payer: COMMERCIAL

## 2024-04-04 ENCOUNTER — APPOINTMENT (OUTPATIENT)
Dept: UROLOGY | Facility: CLINIC | Age: 34
End: 2024-04-04
Payer: COMMERCIAL

## 2024-04-26 ENCOUNTER — PROCEDURE VISIT (OUTPATIENT)
Dept: UROLOGY | Facility: CLINIC | Age: 34
End: 2024-04-26
Payer: COMMERCIAL

## 2024-04-26 DIAGNOSIS — R31.29 MICROHEMATURIA: ICD-10-CM

## 2024-04-26 LAB
POC BILIRUBIN, URINE: NEGATIVE
POC BLOOD, URINE: ABNORMAL
POC GLUCOSE, URINE: NEGATIVE MG/DL
POC KETONES, URINE: NEGATIVE MG/DL
POC LEUKOCYTES, URINE: ABNORMAL
POC NITRITE,URINE: NEGATIVE
POC PH, URINE: 6 PH
POC PROTEIN, URINE: ABNORMAL MG/DL
POC SPECIFIC GRAVITY, URINE: >=1.03
POC UROBILINOGEN, URINE: 0.2 EU/DL

## 2024-04-26 PROCEDURE — 52000 CYSTOURETHROSCOPY: CPT | Performed by: UROLOGY

## 2024-04-26 PROCEDURE — 81003 URINALYSIS AUTO W/O SCOPE: CPT | Performed by: UROLOGY

## 2024-04-26 RX ORDER — LIDOCAINE HYDROCHLORIDE 20 MG/ML
1 JELLY TOPICAL ONCE
Status: COMPLETED | OUTPATIENT
Start: 2024-04-26 | End: 2024-04-26

## 2024-04-26 RX ORDER — CIPROFLOXACIN 500 MG/1
500 TABLET ORAL ONCE
Status: COMPLETED | OUTPATIENT
Start: 2024-04-26 | End: 2024-04-26

## 2024-04-26 RX ADMIN — LIDOCAINE HYDROCHLORIDE 1 APPLICATION: 20 JELLY TOPICAL at 08:51

## 2024-04-26 RX ADMIN — CIPROFLOXACIN 500 MG: 500 TABLET ORAL at 08:51

## 2024-04-26 NOTE — PROGRESS NOTES
2024   Cystoscopy    A cystoscopy was performed under local without difficulty    Findings: Normal urethra no tumor no stone in the bladder    Pain evaluation: 0/10 before, 2/10 after.    We discussed renal ultrasound unremarkable  We discussed cystoscopy unremarkable  All the questions were answered, the patient expressed understanding and agreed to the plan.    Impression  Microscopic hematuria    Plan  Follow-up with Anabel in 2 weeks        Patient of Anabel Mcfarland PA-C.  Chief Complaint   Patient presents with    Microscopic Hematuria     Patient here for cystoscopy for microscopic hematuria work-up. She denies any instance of gross hematuria. She is not symptomatic for UTI and denies hx of UTI.         Physical Exam     TODAYS LAB RESULTS:  === 24 ===    US RENAL COMPLETE    - Impression -  No renal calculus or hydronephrosis.      POC Glucose, Urine  NEGATIVE mg/dl NEGATIVE   POC Bilirubin, Urine  NEGATIVE NEGATIVE   POC Ketones, Urine  NEGATIVE mg/dl NEGATIVE   POC Specific Gravity, Urine  1.005 - 1.035 >=1.030   POC Blood, Urine  NEGATIVE TRACE-Intact Abnormal    POC PH, Urine  No Reference Range Established PH 6.0   POC Protein, Urine  NEGATIVE, 30 (1+) mg/dl TRACE Abnormal    POC Urobilinogen, Urine  0.2, 1.0 EU/DL 0.2   Poc Nitrite, Urine  NEGATIVE NEGATIVE   POC Leukocytes, Urine  NEGATIVE LARGE (3+) Abnormal      ASSESSMENT&PLAN:      IMPRESSIONS:    2024 Anabel   This is a 34 y.o. female,  presenting to the office as a new patient for microhematuria found by her PCP on microscopic urine lab , RBC 11-20 and WBC 21-50. Urine culture  negative for bacterial growth. Her endocrinologist ordered a 24 hour urine Creatinine resulting in elevated levels of 2.03, and cortisol urine resulted in Creatinine elevated as well . The patient denies gross hematuria, dysuria, flank pain, abdominal pain, nausea, vomiting, fever or chills. The patient denies a history of kidney stones  or any urological history. The patient reports she has IBS and follows with with Kerrie Stinson CNP. She reports her LMP was 1/7/2024 she used to have irregular periods and was diagnosed with PCOS, her endocrinologist prescribed a medication to help her periods be regular. Ovarian cyst removed in 2007 by a physician in [place]. She denies a history of any cancer. She denies ever using tobacco.

## 2024-05-17 DIAGNOSIS — K21.9 GASTROESOPHAGEAL REFLUX DISEASE WITHOUT ESOPHAGITIS: ICD-10-CM

## 2024-05-22 RX ORDER — PANTOPRAZOLE SODIUM 40 MG/1
40 TABLET, DELAYED RELEASE ORAL DAILY
Qty: 30 TABLET | Refills: 0 | Status: SHIPPED | OUTPATIENT
Start: 2024-05-22

## 2024-05-30 DIAGNOSIS — K21.9 GASTROESOPHAGEAL REFLUX DISEASE WITHOUT ESOPHAGITIS: ICD-10-CM

## 2024-05-30 RX ORDER — PANTOPRAZOLE SODIUM 40 MG/1
40 TABLET, DELAYED RELEASE ORAL DAILY
Qty: 30 TABLET | Refills: 0 | OUTPATIENT
Start: 2024-05-30

## 2024-05-30 NOTE — TELEPHONE ENCOUNTER
Pt needs refill on pantoprazole sent to [facility] in [place] - pt states the pharmacy is saying they never received the refill sent on 5/22/24.

## 2024-07-02 DIAGNOSIS — K21.9 GASTROESOPHAGEAL REFLUX DISEASE WITHOUT ESOPHAGITIS: ICD-10-CM

## 2024-07-03 RX ORDER — PANTOPRAZOLE SODIUM 40 MG/1
40 TABLET, DELAYED RELEASE ORAL DAILY
Qty: 30 TABLET | Refills: 0 | Status: SHIPPED | OUTPATIENT
Start: 2024-07-03

## 2024-07-26 ENCOUNTER — APPOINTMENT (OUTPATIENT)
Dept: GASTROENTEROLOGY | Facility: CLINIC | Age: 34
End: 2024-07-26
Payer: COMMERCIAL

## 2024-07-26 VITALS
OXYGEN SATURATION: 97 % | BODY MASS INDEX: 38.66 KG/M2 | HEART RATE: 94 BPM | SYSTOLIC BLOOD PRESSURE: 104 MMHG | DIASTOLIC BLOOD PRESSURE: 68 MMHG | WEIGHT: 261 LBS | HEIGHT: 69 IN

## 2024-07-26 DIAGNOSIS — K21.9 GASTROESOPHAGEAL REFLUX DISEASE WITHOUT ESOPHAGITIS: ICD-10-CM

## 2024-07-26 DIAGNOSIS — K58.0 IRRITABLE BOWEL SYNDROME WITH DIARRHEA: Primary | ICD-10-CM

## 2024-07-26 PROCEDURE — 99214 OFFICE O/P EST MOD 30 MIN: CPT | Performed by: NURSE PRACTITIONER

## 2024-07-26 PROCEDURE — 3008F BODY MASS INDEX DOCD: CPT | Performed by: NURSE PRACTITIONER

## 2024-07-26 RX ORDER — PANTOPRAZOLE SODIUM 20 MG/1
20 TABLET, DELAYED RELEASE ORAL DAILY
Qty: 30 TABLET | Refills: 0 | Status: SHIPPED | OUTPATIENT
Start: 2024-07-26 | End: 2024-08-25

## 2024-07-26 ASSESSMENT — ENCOUNTER SYMPTOMS
FEVER: 0
JOINT SWELLING: 0
WEAKNESS: 0
WOUND: 0
ROS GI COMMENTS: SEE HPI
CHILLS: 0
ARTHRALGIAS: 0
BRUISES/BLEEDS EASILY: 0
TROUBLE SWALLOWING: 0
SORE THROAT: 0
ADENOPATHY: 0
DIZZINESS: 0
CONFUSION: 0
COUGH: 0
SHORTNESS OF BREATH: 0
DIFFICULTY URINATING: 0
PALPITATIONS: 0

## 2024-07-26 NOTE — PATIENT INSTRUCTIONS
Thank you for coming to your appointment today   - I have sent pantoprazole 20mg daily for 1 month. Let me know how this works. If needed, we can go back to 40mg daily   - I have sent xifaxan. Take 1 pill 3 times per day for 2 weeks, then stop. There will be 2 refills on this medication. Do not refill it and start it unless we discuss that first   - follow up 4 months     Please call [phone] with any questions or concerns       If you utilize [employer] messages, please understand that these are intended to be used for simple and straightforward medical questions. If you have a more complex question or numerous complaints, an office appointment may be needed.

## 2024-07-26 NOTE — PROGRESS NOTES
Subjective   Patient ID: Nazia Antonio is a 34 y.o. female with PMH of GERD, IBS, obesity, and PCOS who presents for Med Refill and Follow-up.     Patient's PCP is Luis Maya MD PhD     Patient initially seen in May 2021 for GERD and diarrhea. Cdiff and stool PCR negative. Pancreatic elastase, TTG, CRP all WNL. EGD and colonoscopy on 6/25/2021 showed mild gastritis but were otherwise normal. Colonoscopy 6/25/2021 showed a normal colon; random biopsies negative for colitis. Reflux improved with pantoprazole 40mg daily. She continued to have diarrhea and was testing for SIBO (2/2024) which was negative. She has made some lifestyle changes by adding more vegetables and lean meat to her diet, and is exercising regularly. This has helped her diarrhea and she has loose stools intermittently now up to 2 times per day rather than 5x/day every day. She has some abdominal pain/cramping. She otherwise denies unintended weight loss, nausea, vomiting, melena, constipation, or hematochezia.     She has been on dicyclomine and loperamide daily since 1/25/2024      Summary of endoscopies:  - EGD 6/2021: normal esophagus, mild gastritis, normal duodenum. Duodenal biopsies negative for histopathologic change.   - Colonoscopy 6/2021: normal colon; random biopsies negative for colitis.     Social Hx:  Tobacco: none  Etoh: socially   Recreational drug use: occasional marijuana  NSAIDs: ibuprofen once a week       Family Hx:  maternal aunt -- crohns disease   No GI malignancy or pancreatitis     Review of Systems:  Review of Systems   Constitutional:  Negative for chills and fever.   HENT:  Negative for sore throat and trouble swallowing.    Respiratory:  Negative for cough and shortness of breath.    Cardiovascular:  Negative for chest pain and palpitations.   Gastrointestinal:         SEE HPI   Endocrine: Negative for cold intolerance and heat intolerance.   Genitourinary:  Negative for difficulty urinating.   Musculoskeletal:   Negative for arthralgias and joint swelling.   Skin:  Negative for rash and wound.   Neurological:  Negative for dizziness and weakness.   Hematological:  Negative for adenopathy. Does not bruise/bleed easily.   Psychiatric/Behavioral:  Negative for confusion.         Medications:  Prior to Admission medications    Medication Sig Start Date End Date Taking? Authorizing Provider   cabergoline (Dostinex) 0.5 mg tablet TAKE 1/2 TABLET BY MOUTH ONCE A WEEK AS DIRECTED   Yes Historical Provider, MD   naproxen (Naprosyn) 375 mg tablet Take 1 tablet (375 mg) by mouth 2 times a day. 9/16/21  Yes Historical Provider, MD   pantoprazole (ProtoNix) 40 mg EC tablet TAKE ONE TABLET BY MOUTH EVERY DAY 12/20/23  Yes MARY Blanco-CNP   multivitamin tablet Take 1 tablet by mouth once daily.    Historical Provider, MD   amoxicillin (Amoxil) 250 mg capsule Take by mouth.  1/25/24  Historical Provider, MD       Allergies:  Patient has no known allergies.    Past Medical History:  She has a past medical history of GERD (gastroesophageal reflux disease), Irritable bowel syndrome, and Other conditions influencing health status.    Past Surgical History:  She has a past surgical history that includes Other surgical history (08/18/2020); Other surgical history (04/12/2021); and Other surgical history (09/25/2020).    Social History:  She reports that she has never smoked. She has never been exposed to tobacco smoke. She has never used smokeless tobacco. She reports that she does not currently use alcohol. She reports that she does not use drugs.    Objective   Physical exam:  Physical Exam  Constitutional:       General: She is not in acute distress.     Appearance: Normal appearance.   HENT:      Mouth/Throat:      Mouth: Mucous membranes are moist.      Comments: pink  Eyes:      Conjunctiva/sclera: Conjunctivae normal.      Pupils: Pupils are equal, round, and reactive to light.   Cardiovascular:      Rate and Rhythm: Normal  rate and regular rhythm.      Heart sounds: No murmur heard.  Pulmonary:      Effort: Pulmonary effort is normal.      Breath sounds: Normal breath sounds.   Abdominal:      General: Bowel sounds are normal. There is no distension.      Palpations: Abdomen is soft.      Tenderness: There is no abdominal tenderness. There is no guarding.   Skin:     General: Skin is warm and dry.      Coloration: Skin is not jaundiced.   Neurological:      Mental Status: She is alert and oriented to person, place, and time.   Psychiatric:         Mood and Affect: Mood normal.         Behavior: Behavior normal.          Assessment/Plan     Reflux   Well controlled with pantoprazole 40mg daily. Will try decreasing to pantoprazole 20mg daily instead; pt will update me on how she does with this in a month.     2. Diarrhea  Ongoing for years but somewhat improved with diet changes and exercise. Has been on dicyclomine and loperamide daily since 1/25/2024 - current. Will try xifaxan 550mg TID x14 days.        Follow up 4 months          MARY Blanco-CNP

## 2024-07-29 NOTE — PROGRESS NOTES
Subjective     Nazia Antonio is a 34 y.o. female who presents for the following: Skin Check.  She notes a brown spot on her left abdomen, which has been present for several years, but has been changing recently, including getting darker in or color, larger in size, and changing shape.  She also states she has been breaking out with more pimples on her cheeks over the past few months, especially this summer, despite using MetroGel.  She denies any other new, changing, or concerning skin lesions; no bleeding, itching, or burning lesions.      Review of Systems:  No other skin or systemic complaints other than what is documented elsewhere in the note.    The following portions of the chart were reviewed this encounter and updated as appropriate:       Skin Cancer History  No skin cancer on file.    Specialty Problems          Dermatology Problems    Rosacea, unspecified       Past Dermatologic / Past Relevant Medical History:    - history of rosacea  - no history of skin cancer     Family History:    No family history of melanoma or skin cancer    Social History:    The patient states she works from home in [place] for [employer] and will be going camping near [place] next week with her family    Allergies:  Patient has no known allergies.    Current Medications / CAM's:    Current Outpatient Medications:     cabergoline (Dostinex) 0.5 mg tablet, TAKE 1/2 TABLET BY MOUTH ONCE A WEEK AS DIRECTED, Disp: , Rfl:     dicyclomine (Bentyl) 10 mg capsule, Take 1 capsule (10 mg) by mouth if needed (4 times per day as needed for cramping). Take 1 tablet 4 times per day as needed for cramping, Disp: 60 capsule, Rfl: 1    loperamide (Anti-DiarrheaL, loperamide,) 2 mg capsule, Take 1 capsule (2 mg) by mouth 4 times a day as needed for diarrhea., Disp: 60 capsule, Rfl: 1    multivitamin tablet, Take 1 tablet by mouth once daily., Disp: , Rfl:     naproxen (Naprosyn) 375 mg tablet, Take 1 tablet (375 mg) by mouth 2  times a day., Disp: , Rfl:     ondansetron (Zofran) 4 mg tablet, Take 1 tablet (4 mg) by mouth every 8 hours if needed for nausea or vomiting., Disp: 15 tablet, Rfl: 1    pantoprazole (ProtoNix) 20 mg EC tablet, Take 1 tablet (20 mg) by mouth once daily., Disp: 30 tablet, Rfl: 0    rifAXIMin (Xifaxan) 550 mg tablet, Take 1 tablet (550 mg) by mouth 3 times a day for 14 days., Disp: 42 tablet, Rfl: 2    minocycline 100 mg capsule, Take 1 capsule (100 mg) by mouth once daily., Disp: 30 capsule, Rfl: 2     Objective   Well appearing patient in no apparent distress; mood and affect are within normal limits.    A full examination was performed including scalp, face, eyes, ears, nose, lips, neck, chest, axillae, abdomen, back, bilateral upper extremities, and bilateral lower extremities. All findings within normal limits unless otherwise noted below.    Assessment/Plan   1. Neoplasm of uncertain behavior of skin (2)  Left Mid-Abdomen  9 mm dark brown pigmented, asymmetric, changing papule with an asymmetric pigment network and irregular borders           Shave removal    Lesion length (cm):  0.9  Margin per side (cm):  0.2  Lesion diameter (cm):  1.3  Informed consent: discussed and consent obtained    Timeout: patient name, date of birth, surgical site, and procedure verified    Procedure prep:  Patient was prepped and draped  Anesthesia: the lesion was anesthetized in a standard fashion    Anesthetic:  1% lidocaine w/ epinephrine 1-100,000 local infiltration  Instrument used: flexible razor blade    Hemostasis achieved with: aluminum chloride    Outcome: patient tolerated procedure well    Post-procedure details: sterile dressing applied and wound care instructions given    Dressing type: bandage and petrolatum      Staff Communication: Dermatology Local Anesthesia: 1 % Lidocaine / Epinephrine - Amount:0.5ml    Specimen 1 - Dermatopathology- DERM LAB  Differential Diagnosis: DN  Check Margins Yes/No?:    Comments:     Dermpath Lab: Routine Histopathology (formalin-fixed tissue)    Right Shoulder  5 mm dark brown pigmented, asymmetric macule with an asymmetric pigment network and irregular borders           Shave removal    Lesion length (cm):  0.5  Margin per side (cm):  0.2  Lesion diameter (cm):  0.9  Informed consent: discussed and consent obtained    Timeout: patient name, date of birth, surgical site, and procedure verified    Procedure prep:  Patient was prepped and draped  Anesthesia: the lesion was anesthetized in a standard fashion    Anesthetic:  1% lidocaine w/ epinephrine 1-100,000 local infiltration  Instrument used: flexible razor blade    Hemostasis achieved with: aluminum chloride    Outcome: patient tolerated procedure well    Post-procedure details: sterile dressing applied and wound care instructions given    Dressing type: bandage and petrolatum      Staff Communication: Dermatology Local Anesthesia: 1 % Lidocaine / Epinephrine - Amount:0.5ml    Specimen 2 - Dermatopathology- DERM LAB  Differential Diagnosis: SK v DN  Check Margins Yes/No?:    Comments:    Dermpath Lab: Routine Histopathology (formalin-fixed tissue)    2. Melanocytic nevus of trunk  Scattered on the patient's face, neck, trunk, and extremities, there are multiple small, round- to oval-shaped, brown-pigmented and pink-colored, symmetric, uniform-appearing macules and dome-shaped papules    Clinically benign- to slightly atypical-appearing nevi - the clinically benign- to slightly atypical-appearing nature of the remainder of the patient's nevi was discussed with the patient today.  None of the patient's nevi, with the exception of the 2 noted above, meet threshold for biopsy today.  I emphasized the importance of performing monthly self-skin exams using the ABCDs of monitoring moles, which were reviewed with the patient today and an informational hand-out provided.  I also emphasized the importance of sun avoidance and sun protection with  daily sunscreen use.    3. Hemangioma of skin  Scattered on the patient's face, neck, trunk, and extremities, there are multiple small, round, cherry red- to purplish-colored, symmetric, uniform, vascular-appearing macules and papules    Cherry Angiomas - the benign nature of these vascular lesions was discussed with the patient today and reassurance provided.  No treatment is medically indicated for these lesions at this time.    4. Rosacea  Head - Anterior (Face)  On the patient's face, most prominent over the bilateral medial cheeks and nose, there is moderate underlying erythema with several overlying telangiectasias and multiple scattered erythematous, inflammatory papules     Rosacea -flare on face; papulo-pustular type.  The chronic and intermittently flaring nature of this condition and treatment options were discussed extensively with the patient today.  After discussing the risks and benefits of various treatment options, particularly topical therapy and oral antibiotics, the patient expressed understanding, and, given the extent of the patient's flare, the patient wishes to begin a course of oral antibiotic therapy.  Thus, I recommend a 3-month course of oral antibiotic therapy with Minocycline 100 mg PO once daily for its anti-inflammatory purposes.  I also discussed the various triggers of rosacea with the patient today, especially sun exposure, and emphasized the importance of trigger avoidance, particularly sun avoidance and sun protection with daily sunscreen use.  The risks, benefits, and side effects of this medication, including possible dizziness or headaches, were discussed; the patient was instructed to take the oral antibiotic with food and a glass of water.  I will have the patient return to my office in 3 months for re-evaluation, at which time we will hopefully transition to topical maintenance therapy.  The patient expressed understanding and is in agreement with this plan.    minocycline  100 mg capsule - Head - Anterior (Face)  Take 1 capsule (100 mg) by mouth once daily.    5. Diffuse photodamage of skin  Photodistributed  Diffuse photodamage with actinic changes with telangiectasia and mottled pigmentation in sun-exposed areas.    Photodamage.  The signs and symptoms of skin cancer were reviewed and the patient was advised to practice sun protection and sun avoidance, use daily sunscreen, and perform regular self skin exams.  Sun protection was discussed, including avoiding the mid-day sun, wearing a sunscreen with SPF at least 50, and stressing the need for reapplication of sunscreen and applying more than they think they need.

## 2024-07-30 ENCOUNTER — OFFICE VISIT (OUTPATIENT)
Dept: DERMATOLOGY | Facility: CLINIC | Age: 34
End: 2024-07-30
Payer: COMMERCIAL

## 2024-07-30 DIAGNOSIS — D48.5 NEOPLASM OF UNCERTAIN BEHAVIOR OF SKIN: Primary | ICD-10-CM

## 2024-07-30 DIAGNOSIS — L71.9 ROSACEA: ICD-10-CM

## 2024-07-30 DIAGNOSIS — L57.8 DIFFUSE PHOTODAMAGE OF SKIN: ICD-10-CM

## 2024-07-30 DIAGNOSIS — D22.5 MELANOCYTIC NEVUS OF TRUNK: ICD-10-CM

## 2024-07-30 DIAGNOSIS — D18.01 HEMANGIOMA OF SKIN: ICD-10-CM

## 2024-07-30 PROCEDURE — 11301 SHAVE SKIN LESION 0.6-1.0 CM: CPT | Performed by: DERMATOLOGY

## 2024-07-30 PROCEDURE — 99214 OFFICE O/P EST MOD 30 MIN: CPT | Performed by: DERMATOLOGY

## 2024-07-30 PROCEDURE — 11302 SHAVE SKIN LESION 1.1-2.0 CM: CPT | Performed by: DERMATOLOGY

## 2024-07-30 RX ORDER — MINOCYCLINE HYDROCHLORIDE 100 MG/1
100 CAPSULE ORAL DAILY
Qty: 30 CAPSULE | Refills: 2 | Status: SHIPPED | OUTPATIENT
Start: 2024-07-30 | End: 2024-10-28

## 2024-07-30 ASSESSMENT — DERMATOLOGY PATIENT ASSESSMENT
DO YOU HAVE ANY NEW OR CHANGING LESIONS: YES
ARE YOU ON BIRTH CONTROL: NO
ARE YOU TRYING TO GET PREGNANT: NO
DO YOU HAVE IRREGULAR MENSTRUAL CYCLES: NO
DO YOU USE SUNSCREEN: OCCASIONALLY
ARE YOU AN ORGAN TRANSPLANT RECIPIENT: NO
DO YOU USE A TANNING BED: NO

## 2024-07-30 ASSESSMENT — ITCH NUMERIC RATING SCALE: HOW SEVERE IS YOUR ITCHING?: 0

## 2024-07-30 ASSESSMENT — DERMATOLOGY QUALITY OF LIFE (QOL) ASSESSMENT: ARE THERE EXCLUSIONS OR EXCEPTIONS FOR THE QUALITY OF LIFE ASSESSMENT: NO

## 2024-08-06 ENCOUNTER — APPOINTMENT (OUTPATIENT)
Dept: UROLOGY | Facility: CLINIC | Age: 34
End: 2024-08-06
Payer: COMMERCIAL

## 2024-08-07 LAB
LAB AP ASR DISCLAIMER: NORMAL
LABORATORY COMMENT REPORT: NORMAL
PATH REPORT.FINAL DX SPEC: NORMAL
PATH REPORT.GROSS SPEC: NORMAL
PATH REPORT.MICROSCOPIC SPEC OTHER STN: NORMAL
PATH REPORT.RELEVANT HX SPEC: NORMAL
PATH REPORT.TOTAL CANCER: NORMAL

## 2024-08-20 ENCOUNTER — APPOINTMENT (OUTPATIENT)
Dept: ENDOCRINOLOGY | Facility: CLINIC | Age: 34
End: 2024-08-20
Payer: COMMERCIAL

## 2024-08-28 ENCOUNTER — TELEPHONE (OUTPATIENT)
Dept: GASTROENTEROLOGY | Facility: CLINIC | Age: 34
End: 2024-08-28
Payer: COMMERCIAL

## 2024-08-28 DIAGNOSIS — K21.9 GASTROESOPHAGEAL REFLUX DISEASE WITHOUT ESOPHAGITIS: ICD-10-CM

## 2024-08-28 RX ORDER — PANTOPRAZOLE SODIUM 20 MG/1
20 TABLET, DELAYED RELEASE ORAL DAILY
Qty: 90 TABLET | Refills: 3 | Status: SHIPPED | OUTPATIENT
Start: 2024-08-28 | End: 2025-08-28

## 2024-08-29 ENCOUNTER — APPOINTMENT (OUTPATIENT)
Dept: DERMATOLOGY | Facility: CLINIC | Age: 34
End: 2024-08-29
Payer: COMMERCIAL

## 2024-11-05 ENCOUNTER — APPOINTMENT (OUTPATIENT)
Dept: DERMATOLOGY | Facility: CLINIC | Age: 34
End: 2024-11-05
Payer: COMMERCIAL

## 2024-11-12 ENCOUNTER — TELEPHONE (OUTPATIENT)
Dept: PRIMARY CARE | Facility: CLINIC | Age: 34
End: 2024-11-12
Payer: COMMERCIAL

## 2024-11-12 NOTE — TELEPHONE ENCOUNTER
Pt is having pain in toe. Pt believes it may be Gout, it runs in her family. Pt is requesting blood work be ordered?

## 2024-11-12 NOTE — TELEPHONE ENCOUNTER
"Patient informed and states she doesn't know if she will even go see Dr. Maya tomorrow as its a waste of her time.She \"may just suffer through this\" until she sees Nicole. She thanked me for the call back.   "

## 2024-11-12 NOTE — TELEPHONE ENCOUNTER
Patient is asking if you can order lab work for her? She thinks she has gout and is in a lot of pain. This is a new patient that yo have not seen before. She will see you in January. I did advise patient to call current PCP and ask him to order labs. It also look like she will see Dr. Maya tomorrow.

## 2024-11-13 ENCOUNTER — HOSPITAL ENCOUNTER (OUTPATIENT)
Dept: RADIOLOGY | Facility: CLINIC | Age: 34
Discharge: HOME | End: 2024-11-13
Payer: COMMERCIAL

## 2024-11-13 ENCOUNTER — LAB (OUTPATIENT)
Dept: LAB | Facility: LAB | Age: 34
End: 2024-11-13
Payer: COMMERCIAL

## 2024-11-13 ENCOUNTER — OFFICE VISIT (OUTPATIENT)
Dept: PRIMARY CARE | Facility: CLINIC | Age: 34
End: 2024-11-13
Payer: COMMERCIAL

## 2024-11-13 VITALS — DIASTOLIC BLOOD PRESSURE: 67 MMHG | SYSTOLIC BLOOD PRESSURE: 123 MMHG

## 2024-11-13 DIAGNOSIS — M79.675 PAIN OF LEFT GREAT TOE: ICD-10-CM

## 2024-11-13 DIAGNOSIS — M79.675 PAIN OF LEFT GREAT TOE: Primary | ICD-10-CM

## 2024-11-13 DIAGNOSIS — M10.9 ACUTE GOUT INVOLVING TOE OF LEFT FOOT, UNSPECIFIED CAUSE: Primary | ICD-10-CM

## 2024-11-13 LAB — URATE SERPL-MCNC: 6.9 MG/DL (ref 2.3–6.7)

## 2024-11-13 PROCEDURE — 73630 X-RAY EXAM OF FOOT: CPT | Mod: LT

## 2024-11-13 PROCEDURE — 36415 COLL VENOUS BLD VENIPUNCTURE: CPT

## 2024-11-13 PROCEDURE — 84550 ASSAY OF BLOOD/URIC ACID: CPT

## 2024-11-13 PROCEDURE — 73630 X-RAY EXAM OF FOOT: CPT | Mod: LEFT SIDE | Performed by: RADIOLOGY

## 2024-11-13 PROCEDURE — 99213 OFFICE O/P EST LOW 20 MIN: CPT | Performed by: FAMILY MEDICINE

## 2024-11-13 RX ORDER — COLCHICINE 0.6 MG/1
TABLET ORAL
Qty: 60 TABLET | Refills: 0 | Status: SHIPPED | OUTPATIENT
Start: 2024-11-13

## 2024-11-13 RX ORDER — ALLOPURINOL 300 MG/1
300 TABLET ORAL DAILY
Qty: 30 TABLET | Refills: 11 | Status: SHIPPED | OUTPATIENT
Start: 2024-11-13 | End: 2025-11-13

## 2024-11-13 NOTE — PROGRESS NOTES
Subjective   Patient ID: Nazia Antonio is a 34 y.o. female who presents for pain at left 1 toe for 2 days    HPI   Constant dull pain at 8/10 at left 1st toe for 2 days. No prior injury. FH of gout  Review of Systems    Objective   /67     Physical Exam  No distress, well groomed, No sclera icterus.  lungs: CTA b/l, heart: RRR, no LE  edema, normal pedal pulses, tenderness at left 1st toe, no local swelling or erythema or skin lesion.   good balance    Assessment/Plan   Assessment & Plan  Pain of left great toe  FH  of gout. Check labs and xr for eval motrin prn with empirical tx with colchicine. Call office if symptoms do not improve in a few days    Orders:    XR foot left 3+ views; Future    Uric Acid; Future    colchicine 0.6 mg tablet; Take one tablet by mouth every 2 hours for 4 doses then twice daily. Hold for diarrhea.

## 2025-01-14 ENCOUNTER — ANCILLARY PROCEDURE (OUTPATIENT)
Dept: CARDIOLOGY | Facility: HOSPITAL | Age: 35
End: 2025-01-14
Payer: COMMERCIAL

## 2025-01-14 ENCOUNTER — APPOINTMENT (OUTPATIENT)
Dept: PRIMARY CARE | Facility: CLINIC | Age: 35
End: 2025-01-14
Payer: COMMERCIAL

## 2025-01-14 VITALS
WEIGHT: 280 LBS | BODY MASS INDEX: 41.47 KG/M2 | SYSTOLIC BLOOD PRESSURE: 110 MMHG | DIASTOLIC BLOOD PRESSURE: 80 MMHG | HEART RATE: 75 BPM | HEIGHT: 69 IN

## 2025-01-14 DIAGNOSIS — R00.2 PALPITATIONS: ICD-10-CM

## 2025-01-14 DIAGNOSIS — E23.6 EMPTY SELLA (MULTI): ICD-10-CM

## 2025-01-14 DIAGNOSIS — Z00.00 HEALTHCARE MAINTENANCE: Primary | ICD-10-CM

## 2025-01-14 DIAGNOSIS — R00.0 TACHYCARDIA: ICD-10-CM

## 2025-01-14 LAB — BODY SURFACE AREA: 2.49 M2

## 2025-01-14 PROCEDURE — 1036F TOBACCO NON-USER: CPT | Performed by: CLINICAL NURSE SPECIALIST

## 2025-01-14 PROCEDURE — 93242 EXT ECG>48HR<7D RECORDING: CPT

## 2025-01-14 PROCEDURE — 3008F BODY MASS INDEX DOCD: CPT | Performed by: CLINICAL NURSE SPECIALIST

## 2025-01-14 PROCEDURE — 99395 PREV VISIT EST AGE 18-39: CPT | Performed by: CLINICAL NURSE SPECIALIST

## 2025-01-14 ASSESSMENT — ENCOUNTER SYMPTOMS
DIZZINESS: 0
APPETITE CHANGE: 0
PHOTOPHOBIA: 0
FATIGUE: 0
FLANK PAIN: 0
TROUBLE SWALLOWING: 0
DIARRHEA: 0
LOSS OF SENSATION IN FEET: 0
EYE PAIN: 0
JOINT SWELLING: 0
FEVER: 0
DEPRESSION: 0
NAUSEA: 0
SORE THROAT: 0
NECK PAIN: 0
BRUISES/BLEEDS EASILY: 0
POLYDIPSIA: 0
VOMITING: 0
HEADACHES: 0
ABDOMINAL PAIN: 0
WOUND: 0
SEIZURES: 0
BLOOD IN STOOL: 0
SLEEP DISTURBANCE: 0
CONSTIPATION: 0
HEMATURIA: 0
ARTHRALGIAS: 0
ACTIVITY CHANGE: 0
CONFUSION: 0
PALPITATIONS: 1
SHORTNESS OF BREATH: 0
COUGH: 0
CHILLS: 0
CHEST TIGHTNESS: 0
UNEXPECTED WEIGHT CHANGE: 0
WHEEZING: 0
MYALGIAS: 0
OCCASIONAL FEELINGS OF UNSTEADINESS: 0
BACK PAIN: 0
DYSURIA: 0

## 2025-01-14 ASSESSMENT — PATIENT HEALTH QUESTIONNAIRE - PHQ9
SUM OF ALL RESPONSES TO PHQ9 QUESTIONS 1 AND 2: 0
2. FEELING DOWN, DEPRESSED OR HOPELESS: NOT AT ALL
1. LITTLE INTEREST OR PLEASURE IN DOING THINGS: NOT AT ALL

## 2025-01-14 ASSESSMENT — COLUMBIA-SUICIDE SEVERITY RATING SCALE - C-SSRS
6. HAVE YOU EVER DONE ANYTHING, STARTED TO DO ANYTHING, OR PREPARED TO DO ANYTHING TO END YOUR LIFE?: NO
2. HAVE YOU ACTUALLY HAD ANY THOUGHTS OF KILLING YOURSELF?: NO
1. IN THE PAST MONTH, HAVE YOU WISHED YOU WERE DEAD OR WISHED YOU COULD GO TO SLEEP AND NOT WAKE UP?: NO

## 2025-01-14 NOTE — PROGRESS NOTES
Subjective   Patient ID: Nazia Antonio is a 35 y.o. female who presents for Annual Exam (Wellness exam Transfer care /).  HPI    New patient here today to establish care. Due for updated Wellness exam. Up to date with [facility] exams.     Has been having episodes of Tachycardia, feels rhythm is off. Can happen at rest and with activity. Tried to capture ECG, normal per patient.     Following with Endo, Dermatology, and GI.     Gout, controlled with Allopurinol. Denies any recent flare ups.     Review of Systems   Constitutional:  Negative for activity change, appetite change, chills, fatigue, fever and unexpected weight change.   HENT:  Negative for ear pain, hearing loss, nosebleeds, sore throat, tinnitus and trouble swallowing.    Eyes:  Negative for photophobia, pain and visual disturbance.   Respiratory:  Negative for cough, chest tightness, shortness of breath and wheezing.    Cardiovascular:  Positive for palpitations. Negative for chest pain and leg swelling.   Gastrointestinal:  Negative for abdominal pain, blood in stool, constipation, diarrhea, nausea and vomiting.   Endocrine: Negative for cold intolerance, heat intolerance, polydipsia and polyuria.   Genitourinary:  Negative for dysuria, flank pain and hematuria.   Musculoskeletal:  Negative for arthralgias, back pain, joint swelling, myalgias and neck pain.   Skin:  Negative for pallor, rash and wound.   Allergic/Immunologic: Negative for immunocompromised state.   Neurological:  Negative for dizziness, seizures and headaches.   Hematological:  Does not bruise/bleed easily.   Psychiatric/Behavioral:  Negative for confusion and sleep disturbance.        Objective   Physical Exam  Vitals and nursing note reviewed.   Constitutional:       General: She is not in acute distress.     Appearance: Normal appearance.   HENT:      Head: Normocephalic.      Nose: Nose normal.   Eyes:      Conjunctiva/sclera: Conjunctivae normal.   Neck:      Vascular: No  carotid bruit.   Cardiovascular:      Rate and Rhythm: Normal rate and regular rhythm.      Pulses: Normal pulses.      Heart sounds: Normal heart sounds.   Pulmonary:      Effort: Pulmonary effort is normal.      Breath sounds: Normal breath sounds.   Abdominal:      General: Bowel sounds are normal.      Palpations: Abdomen is soft.   Musculoskeletal:         General: Normal range of motion.      Cervical back: Normal range of motion.   Skin:     General: Skin is warm and dry.   Neurological:      Mental Status: She is alert and oriented to person, place, and time. Mental status is at baseline.   Psychiatric:         Mood and Affect: Mood normal.         Behavior: Behavior normal.       Assessment/Plan       Updated lab work ordered at visit today.     Wellness: Routine and age appropriate recommendations discussed with the patient today and patient verbalized understanding of the recommendations.  Questions answered.  Age appropriate immunizations and preventative screenings discussed with the patient and ordered as appropriate. Labs updated and ordered as indicated. Recommend healthy diet and daily exercise to maintain healthy body weight.   Tachycardia, Palpitations: Holter ordered. Will follow up pending results.   Gout: Continue Allopurinol. Colchicine PRN.  Empty Sella: Following with Endo for management.      Declined updated immunizations.   Wellness: January 2025.   Continue to follow with Specialists as previously determined.     Nicole Cornejo, APRN-CNS 01/14/25 11:54 AM

## 2025-01-21 ENCOUNTER — LAB (OUTPATIENT)
Dept: LAB | Facility: LAB | Age: 35
End: 2025-01-21
Payer: COMMERCIAL

## 2025-01-21 DIAGNOSIS — R00.0 TACHYCARDIA: ICD-10-CM

## 2025-01-21 DIAGNOSIS — R00.2 PALPITATIONS: ICD-10-CM

## 2025-01-21 DIAGNOSIS — Z00.00 HEALTHCARE MAINTENANCE: ICD-10-CM

## 2025-01-21 LAB
ALBUMIN SERPL BCP-MCNC: 4.6 G/DL (ref 3.4–5)
ALP SERPL-CCNC: 85 U/L (ref 33–110)
ALT SERPL W P-5'-P-CCNC: 65 U/L (ref 7–45)
ANION GAP SERPL CALC-SCNC: 8 MMOL/L (ref 10–20)
AST SERPL W P-5'-P-CCNC: 38 U/L (ref 9–39)
BILIRUB SERPL-MCNC: 0.7 MG/DL (ref 0–1.2)
BUN SERPL-MCNC: 11 MG/DL (ref 6–23)
CALCIUM SERPL-MCNC: 9.5 MG/DL (ref 8.6–10.3)
CHLORIDE SERPL-SCNC: 104 MMOL/L (ref 98–107)
CHOLEST SERPL-MCNC: 173 MG/DL (ref 0–199)
CHOLESTEROL/HDL RATIO: 3.6
CO2 SERPL-SCNC: 27 MMOL/L (ref 21–32)
CREAT SERPL-MCNC: 0.89 MG/DL (ref 0.5–1.05)
EGFRCR SERPLBLD CKD-EPI 2021: 86 ML/MIN/1.73M*2
ERYTHROCYTE [DISTWIDTH] IN BLOOD BY AUTOMATED COUNT: 13.1 % (ref 11.5–14.5)
EST. AVERAGE GLUCOSE BLD GHB EST-MCNC: 91 MG/DL
GLUCOSE SERPL-MCNC: 87 MG/DL (ref 74–99)
HBA1C MFR BLD: 4.8 %
HCT VFR BLD AUTO: 42.6 % (ref 36–46)
HCV AB SER QL: NONREACTIVE
HDLC SERPL-MCNC: 47.5 MG/DL
HGB BLD-MCNC: 14 G/DL (ref 12–16)
HIV 1+2 AB+HIV1 P24 AG SERPL QL IA: ABNORMAL
LDLC SERPL CALC-MCNC: 101 MG/DL
MCH RBC QN AUTO: 29.9 PG (ref 26–34)
MCHC RBC AUTO-ENTMCNC: 32.9 G/DL (ref 32–36)
MCV RBC AUTO: 91 FL (ref 80–100)
NON HDL CHOLESTEROL: 126 MG/DL (ref 0–149)
NRBC BLD-RTO: 0 /100 WBCS (ref 0–0)
PLATELET # BLD AUTO: 237 X10*3/UL (ref 150–450)
POTASSIUM SERPL-SCNC: 4.2 MMOL/L (ref 3.5–5.3)
PROT SERPL-MCNC: 7 G/DL (ref 6.4–8.2)
RBC # BLD AUTO: 4.69 X10*6/UL (ref 4–5.2)
SODIUM SERPL-SCNC: 135 MMOL/L (ref 136–145)
TRIGL SERPL-MCNC: 125 MG/DL (ref 0–149)
TSH SERPL-ACNC: 1.83 MIU/L (ref 0.44–3.98)
URATE SERPL-MCNC: 4.3 MG/DL (ref 2.3–6.7)
VIT B12 SERPL-MCNC: 526 PG/ML (ref 211–911)
VLDL: 25 MG/DL (ref 0–40)
WBC # BLD AUTO: 8.2 X10*3/UL (ref 4.4–11.3)

## 2025-01-21 PROCEDURE — 86703 HIV-1/HIV-2 1 RESULT ANTBDY: CPT

## 2025-01-21 PROCEDURE — 84443 ASSAY THYROID STIM HORMONE: CPT

## 2025-01-21 PROCEDURE — 80053 COMPREHEN METABOLIC PANEL: CPT

## 2025-01-21 PROCEDURE — 87389 HIV-1 AG W/HIV-1&-2 AB AG IA: CPT

## 2025-01-21 PROCEDURE — 82607 VITAMIN B-12: CPT

## 2025-01-21 PROCEDURE — 85027 COMPLETE CBC AUTOMATED: CPT

## 2025-01-21 PROCEDURE — 84550 ASSAY OF BLOOD/URIC ACID: CPT

## 2025-01-21 PROCEDURE — 87536 HIV-1 QUANT&REVRSE TRNSCRPJ: CPT

## 2025-01-21 PROCEDURE — 86803 HEPATITIS C AB TEST: CPT

## 2025-01-21 PROCEDURE — 83036 HEMOGLOBIN GLYCOSYLATED A1C: CPT

## 2025-01-21 PROCEDURE — 80061 LIPID PANEL: CPT

## 2025-01-22 LAB — HIV 1 & 2 AB SERPL IA.RAPID: NEGATIVE

## 2025-01-23 DIAGNOSIS — E78.00 ELEVATED LDL CHOLESTEROL LEVEL: ICD-10-CM

## 2025-01-23 DIAGNOSIS — R00.2 PALPITATIONS: ICD-10-CM

## 2025-01-23 DIAGNOSIS — R74.8 ELEVATED LIVER ENZYMES: ICD-10-CM

## 2025-01-23 DIAGNOSIS — R89.9 ABNORMAL LABORATORY TEST: Primary | ICD-10-CM

## 2025-01-23 LAB
HIV-INTEGRATED COMMENT: NORMAL
HIV1 RNA # PLAS NAA DL=20: NOT DETECTED {COPIES}/ML
HIV1 RNA SPEC NAA+PROBE-LOG#: NORMAL {LOG_COPIES}/ML

## 2025-01-24 ENCOUNTER — TELEPHONE (OUTPATIENT)
Dept: IMMUNOLOGY | Facility: CLINIC | Age: 35
End: 2025-01-24
Payer: COMMERCIAL

## 2025-01-24 NOTE — TELEPHONE ENCOUNTER
Time Spent: 15 mins   Pt called [facility]   Pt referred by PCP (BALA Cornejo APRN-CNS)   Pt Ag/Ab initially reactive and confirmatory test nonreactive   [facility] notified RN (Anayeli Pavon) for pt f/U with viral load test to confirm status   Pt resides closest to [facility]   EIS shared contact information     Addendum: 01/27/2025  RN (Anayeli Pavon) confirmed viral load test  Pt texted [facility] pt linked to ID f/U   [facility]IS relayed information for PrEP referral   Pt declined PrEP referral     PLAN:   Pt no longer meets objectives for [facility] f/U   [facility] available if pt would like linkage to prevention in the future

## 2025-01-27 ENCOUNTER — DOCUMENTATION (OUTPATIENT)
Dept: IMMUNOLOGY | Facility: CLINIC | Age: 35
End: 2025-01-27

## 2025-01-27 LAB — BODY SURFACE AREA: 2.49 M2

## 2025-01-27 PROCEDURE — 93244 EXT ECG>48HR<7D REV&INTERPJ: CPT | Performed by: INTERNAL MEDICINE

## 2025-01-27 NOTE — PROGRESS NOTES
Received referral for pt to be seen in the [facility]. Initial HIV antibody test was reactive. Confirmatory test is negative and HIV RNA test is not detected. Reviewed w/ Dr. Monica Shoemaker. This is a false positive ag/ab test. Spoke to pt to notify her. No need for pt to be seen in the [facility]. Told pt to call if she ever has any questions.

## 2025-02-17 ENCOUNTER — OFFICE VISIT (OUTPATIENT)
Dept: INFECTIOUS DISEASES | Facility: HOSPITAL | Age: 35
End: 2025-02-17
Payer: COMMERCIAL

## 2025-02-17 VITALS
OXYGEN SATURATION: 92 % | RESPIRATION RATE: 20 BRPM | HEART RATE: 100 BPM | SYSTOLIC BLOOD PRESSURE: 142 MMHG | DIASTOLIC BLOOD PRESSURE: 78 MMHG | TEMPERATURE: 98.6 F

## 2025-02-17 DIAGNOSIS — Z78.9 FALSE POSITIVE HIV SEROLOGY: Primary | ICD-10-CM

## 2025-02-17 ASSESSMENT — PAIN SCALES - GENERAL: PAINLEVEL_OUTOF10: 0-NO PAIN

## 2025-02-17 ASSESSMENT — ENCOUNTER SYMPTOMS
LOSS OF SENSATION IN FEET: 0
OCCASIONAL FEELINGS OF UNSTEADINESS: 0
DEPRESSION: 0

## 2025-02-17 NOTE — PROGRESS NOTES
Infectious Diseases Clinic Consult Note:            Reason For Consult  Screening HIV positive      Assessment/Plan:    #False positive HIV test  Screening test positive but HIV1/2 confirmation and PCR negative. No risk factors for HIV. Patient is monogamous with 1 female partner.     Recommendations:    -False positve HIV test  -Can ask PCP for gonorhoea. Chlamydia and syphilis testing  -Follow up JENN Stuart MD  Date of service: 2/17/2025  Time of service: 1:55 PM      History Of Present Illness  Nazia Antonio is a 35 y.o. female presenting with HIV positive test. Patient states that she recently saw a new PCP and had HIV test done as part of regular work up which came back positive and was sent to the ID clinic for further eval     Past Medical History  She has a past medical history of GERD (gastroesophageal reflux disease), Irritable bowel syndrome, and Other conditions influencing health status.    Surgical History  She has a past surgical history that includes Other surgical history (08/18/2020); Other surgical history (04/12/2021); and Other surgical history (09/25/2020).     Social History     Occupational History    Not on file   Tobacco Use    Smoking status: Never     Passive exposure: Never    Smokeless tobacco: Never   Vaping Use    Vaping status: Never Used   Substance and Sexual Activity    Alcohol use: Not Currently     Comment: occasional    Drug use: Never    Sexual activity: Yes     Partners: Female     Birth control/protection: None     Travel History   Travel since 01/17/25    No documented travel since 01/17/25              Family History  Family History   Problem Relation Name Age of Onset    Diabetes Mother      Alcohol abuse Father Akhil     Cirrhosis Father Akhil     Atrial fibrillation Father Akhil     Crohn's disease Mother's Sister Federica     Cirrhosis Mother's Brother      Crohn's disease Mother's Brother       Allergies  Patient has no known allergies.     Immunization  History   Administered Date(s) Administered    COVID-19, mRNA, LNP-S, PF, 30 mcg/0.3 mL dose 03/31/2021, 04/21/2021     Medications  Home medications:  (Not in a hospital admission)      Review of Systems     Constitutional:  Denies appetite change, chills, fatigue, fever.  HENT:  Denies ear discharge, ear pain, sore throat    Eyes:  Denies photophobia, eye drainage  Respiratory:  Denies cough, chest tightness, SOB  Cardiovascular:  Denies chest pain, palpitations  Gastrointestinal:  Denies abdominal pain, diarrhea, nausea, vomiting.   Genitourinary:  Denies dysuria, flank pain, frequency  Musculoskeletal:  Denies joint pain  Skin:  Denies ulcer and rash   Neurological:  Denies light-headedness, numbness and headaches.    Objective  Range of Vitals (last 24 hours)  [unfilled]  Daily Weight  01/14/25 : 127 kg (280 lb)    There is no height or weight on file to calculate BMI.     Physical Exam  There were no vitals taken for this visit.  @TMAX(24)@      General: alert, oriented, NAD  Lungs: bilaterally clear to auscultation, no wheezing  Heart: regular rate and rhythm, no murmur  Abdomen: soft, non tender, non distended, BS+  Neuro: AAO x 3, PERRL, CN grossly intact  Extremities: no edema, no cyanosis  Skin: No rashes or ulcers  MSK: No joint inflammation     Relevant Results    Labs              CrCl cannot be calculated (Patient's most recent lab result is older than the maximum 7 days allowed.).  CRP   Date Value Ref Range Status   09/16/2021 6.15 (A) mg/dL Final     Comment:     REF VALUE  < 1.00     05/11/2021 0.88 mg/dL Final     Comment:     REF VALUE  < 1.00       Sedimentation Rate   Date Value Ref Range Status   01/12/2022 14 0 - 20 mm/h Final   09/16/2021 66 (H) 0 - 20 mm/h Final     HIV 1/2 Antigen/Antibody Screen with Reflex to Confirmation   Date Value Ref Range Status   01/21/2025 Initially Reactive (A) Nonreactive Final     HIV 1/2 Antibody Confirmation   Date Value Ref Range Status   01/21/2025 Negative  Negative Final     Comment:     A positive/indeterminate HIV Ag/Ab screening test followed by a   negative HIV1/HIV2 confirmation test may indicate either a false   positive screening test, acute HIV infection, or, rarely, an HIV  strain that reacts poorly with the confirmatory test. HIV RNA by  PCR (for HIV-1) is ordered for further evaluation. The HIV RNA   by PCR test will be performed and reported separately.     Hepatitis C AB   Date Value Ref Range Status   01/21/2025 Nonreactive Nonreactive Final     Comment:     Results from patients taking biotin supplements or receiving high-dose biotin therapy should be interpreted with caution due to possible interference with this test. Providers may contact their local laboratory for further information.       Microbiology  No results found for the last 14 days.      Imaging  No results found.

## 2025-02-17 NOTE — LETTER
02/17/25    VIRGIE Lerma  [address]  [place] [address]      Dear VIRGIE Aragon,    I am writing to confirm that your patient, Nazia Antonio, received care in my office on 02/17/25. I have enclosed a summary of the care provided to Nazia for your reference.    Please contact me with any questions you may have regarding the visit.    Sincerely,         Bentley Stuart MD  [address]  [place] [address]  [phone]    CC: No Recipients

## 2025-03-31 ENCOUNTER — PATIENT MESSAGE (OUTPATIENT)
Dept: ENDOCRINOLOGY | Facility: CLINIC | Age: 35
End: 2025-03-31
Payer: COMMERCIAL

## 2025-03-31 DIAGNOSIS — R79.89 ELEVATED PROLACTIN LEVEL: Primary | ICD-10-CM

## 2025-03-31 RX ORDER — CABERGOLINE 0.5 MG/1
0.25 TABLET ORAL
Qty: 6 TABLET | Refills: 1 | Status: SHIPPED | OUTPATIENT
Start: 2025-04-06 | End: 2025-10-03

## 2025-03-31 NOTE — TELEPHONE ENCOUNTER
(Next appt 7/1/2025)    Patient is needing refill on cabergoline 0.5 sent to [facility] pharmacy

## 2025-05-21 ENCOUNTER — OFFICE VISIT (OUTPATIENT)
Dept: URGENT CARE | Age: 35
End: 2025-05-21
Payer: COMMERCIAL

## 2025-05-21 VITALS
HEART RATE: 86 BPM | OXYGEN SATURATION: 98 % | DIASTOLIC BLOOD PRESSURE: 88 MMHG | TEMPERATURE: 98.1 F | SYSTOLIC BLOOD PRESSURE: 134 MMHG | RESPIRATION RATE: 16 BRPM

## 2025-05-21 DIAGNOSIS — J01.10 ACUTE NON-RECURRENT FRONTAL SINUSITIS: Primary | ICD-10-CM

## 2025-05-21 DIAGNOSIS — J02.9 SORE THROAT: ICD-10-CM

## 2025-05-21 LAB
POC HUMAN RHINOVIRUS PCR: NEGATIVE
POC INFLUENZA A VIRUS PCR: NEGATIVE
POC INFLUENZA B VIRUS PCR: NEGATIVE
POC RESPIRATORY SYNCYTIAL VIRUS PCR: NEGATIVE
POC STREPTOCOCCUS PYOGENES (GROUP A STREP) PCR: NEGATIVE

## 2025-05-21 PROCEDURE — 87631 RESP VIRUS 3-5 TARGETS: CPT | Performed by: NURSE PRACTITIONER

## 2025-05-21 PROCEDURE — 99213 OFFICE O/P EST LOW 20 MIN: CPT | Performed by: NURSE PRACTITIONER

## 2025-05-21 PROCEDURE — 87651 STREP A DNA AMP PROBE: CPT | Performed by: NURSE PRACTITIONER

## 2025-05-21 RX ORDER — AMOXICILLIN AND CLAVULANATE POTASSIUM 875; 125 MG/1; MG/1
1 TABLET, FILM COATED ORAL 2 TIMES DAILY
Qty: 20 TABLET | Refills: 0 | Status: SHIPPED | OUTPATIENT
Start: 2025-05-21 | End: 2025-05-31

## 2025-05-21 NOTE — PROGRESS NOTES
Subjective   Chief Complaint:  Nazia Antonio presents with complaints of a sore throat this visit.    HPI: Sinus congestion that started 2 weeks ago, now throat is sore, taking sudafed, not helping much    Course: intermittent  Progression: worsened  Severity:mild  Helpful treatments: none  Unhelpful treatments tried: none    Review of Systems    Objective   Physical Exam: /88   Pulse 86   Temp 36.7 °C (98.1 °F)   Resp 16   SpO2 98%   General appearance: alert and oriented, in no acute distress  Mouth/throat: Mucosa moist, no lesions; pharynx erythema, No edema or exudate.  Neck: neck- supple, no mass, non-tender  General: Vitals noted, no distress, afebrile. Normal phonation, no stridor, no trismus  ENT: TMs bulging with cloudy effusion bilaterally, EACs unremarkable. Tender maxillary sinus.  Posterior oropharynx-mucous drainage, Uvula is in the midline and non-edematous. No Westley's angina.  Neck: Supple. No meningismus through full range of motion. No lymphadenopathy.   Cardiac: Regular rate and rhythm, no murmur.  Lungs: Good aeration throughout. No adventitious breath sounds.   Abdomen: Soft, nontender, nonsurgical throughout. Normoactive bowel sounds.   Extremities: No peripheral edema  Skin: No rash  Neuro: No focal neurologic deficits.     Spot fire strep-Negative    Assessment/Plan   Diagnoses and all orders for this visit:  Acute non-recurrent frontal sinusitis  -     amoxicillin-clavulanate (Augmentin) 875-125 mg tablet; Take 1 tablet by mouth 2 times a day for 10 days.  Sore throat  -     POCT SPOTFIRE R/ST Panel Mini w/Strep A ([facility]) manually resulted  -     amoxicillin-clavulanate (Augmentin) 875-125 mg tablet; Take 1 tablet by mouth 2 times a day for 10 days.

## 2025-05-29 ENCOUNTER — PATIENT MESSAGE (OUTPATIENT)
Dept: PRIMARY CARE | Facility: CLINIC | Age: 35
End: 2025-05-29
Payer: COMMERCIAL

## 2025-07-01 ENCOUNTER — APPOINTMENT (OUTPATIENT)
Dept: ENDOCRINOLOGY | Facility: CLINIC | Age: 35
End: 2025-07-01
Payer: COMMERCIAL

## 2025-07-01 VITALS
WEIGHT: 250.4 LBS | DIASTOLIC BLOOD PRESSURE: 68 MMHG | SYSTOLIC BLOOD PRESSURE: 124 MMHG | BODY MASS INDEX: 37.09 KG/M2 | HEIGHT: 69 IN | HEART RATE: 96 BPM

## 2025-07-01 DIAGNOSIS — E28.2 POLYCYSTIC OVARIAN SYNDROME: Primary | ICD-10-CM

## 2025-07-01 DIAGNOSIS — E23.6 EMPTY SELLA (MULTI): ICD-10-CM

## 2025-07-01 DIAGNOSIS — R79.89 ELEVATED PROLACTIN LEVEL: ICD-10-CM

## 2025-07-01 DIAGNOSIS — R79.89 ELEVATION OF ADRENOCORTICOTROPIC HORMONE (ACTH): ICD-10-CM

## 2025-07-01 DIAGNOSIS — R79.89 ACTH ELEVATION: ICD-10-CM

## 2025-07-01 PROCEDURE — 3008F BODY MASS INDEX DOCD: CPT | Performed by: INTERNAL MEDICINE

## 2025-07-01 PROCEDURE — 99214 OFFICE O/P EST MOD 30 MIN: CPT | Performed by: INTERNAL MEDICINE

## 2025-07-01 NOTE — PATIENT INSTRUCTIONS
Thank you for choosing [facility]  for your health care needs.  If you have any questions, concerns or medical needs, please feel free to contact our office at [phone].    Please ensure you complete your blood work one week before the next scheduled appointment.    To continue Cabergoline 0.25mg once weekly   To obtain blood tests at the next blood draw   Will follow up with results   For follow up in 12 months

## 2025-07-01 NOTE — PROGRESS NOTES
"Subjective     Ms. Antonio is a 35 year old female who presents for follow up for PCOS and an elevated prolactin level.     The patient sates that menarche was in the 8th grade. She cannot recall if menses were irregular from the onset, but she does state that they where irregular in high school and college. She could have had three months without a menstrual bleed.      She admits to having hirsutism. She can remove hair daily. She describes it as being black and coarse. She denies any voice changes.     She has been having acne.      LMP 6/15; menses are now regular   Flow of 7 days   Heavier flow     She has lost 40 lbs via compounded Semaglutide.  She is now conscious of choices and portions     Exercise regimen - walks dog; gym membership; tries to get 10K steps per day      Current Regimen  Cabergoline 0.25mg once weekly on Saturdays      She had an MRI in October 2020 which revealed a partially empty sella.         Symptoms are as listed below:  Weight - lost as mentioned above  Energy - improved   Sleep - has mild sleep apnea; easily awakenings   Temperature intolerances - denies   Bowel movements - has IBS-D; regular   Skin changes -chronic acne; she has rosacea; sees Dermatology   Hair changes - denies   Headaches - admits due to computer screen all day   Vision - denies changes       Review of Systems   All other systems reviewed and are negative.      Objective   Visit Vitals  /68   Pulse 96   Ht 1.753 m (5' 9\")   Wt 114 kg (250 lb 6.4 oz)   BMI 36.98 kg/m²   Smoking Status Never   BSA 2.36 m²       Physical Exam  Vitals and nursing note reviewed.   Constitutional:       General: She is not in acute distress.     Appearance: Normal appearance. She is obese.   HENT:      Head: Normocephalic and atraumatic.      Nose: Nose normal.      Mouth/Throat:      Mouth: Mucous membranes are moist.   Eyes:      Extraocular Movements: Extraocular movements intact.   Cardiovascular:      Rate and Rhythm: " Normal rate and regular rhythm.   Pulmonary:      Effort: Pulmonary effort is normal.      Breath sounds: Normal breath sounds.   Musculoskeletal:         General: Normal range of motion.   Skin:     General: Skin is warm.   Neurological:      Mental Status: She is alert and oriented to person, place, and time.      Deep Tendon Reflexes: Reflexes normal.      Comments: No tremors to outstretched hands     Psychiatric:         Mood and Affect: Mood normal.       Lab Results   Component Value Date    CORTISOL 26.3 (H) 01/31/2024    ACTH 82.3 (H) 01/31/2024    TSH 1.83 01/21/2025    FREET4 0.74 01/12/2022    FSH 3.9 01/31/2024    LH 10.1 01/31/2024    PROLACTIN 5.1 01/31/2024      Imaging  MRI Pituitary 10/27/2020  FINDINGS:  There is partial flattening of the pituitary gland along the floor of  the sella which also appears somewhat expanded. There is no evidence of a sellar or suprasellar mass. The pituitary gland enhances homogenously. The pituitary infundibulum is midline. The optic chiasm is unremarkable.  Bilateral cavernous sinuses demonstrate symmetric enhancement. Visualized internal carotid arteries demonstrate expected flow voids.     The ventricular system is nondilated. There is no mass effect or  midline shift. No abnormal parenchymal enhancement is identified.     There is mucosal thickening within the maxillary sinuses, right  greater than left and along the sphenoid sinus floor. There is  partial opacification of scattered anterior ethmoid air cells. The  mastoid air cells are clear.     IMPRESSION:  Findings potentially representing a partially empty sella of  uncertain clinical significance. This may be incidental but may be  seen with intracranial hypertension.    Assessment/Plan   35 -year-old female presents for follow up for PCOS and an elevated prolactin level.       Elevated prolactin level  To continue Cabergoline 0.25mg once weekly   Menses have regularized with the above  To obtain blood  tests at the next blood draw     Polycystic ovarian syndrome  To obtain lab tests to determine the effects of weight loss on this entity     Empty sella (Multi)  To obtain pituitary panel     ACTH elevation  To obtain adrenal studies    Will follow up with results   For follow up in 12 months

## 2025-07-06 PROBLEM — R79.89 ACTH ELEVATION: Status: ACTIVE | Noted: 2025-07-06

## 2025-07-06 NOTE — ASSESSMENT & PLAN NOTE
To continue Cabergoline 0.25mg once weekly   Menses have regularized with the above  To obtain blood tests at the next blood draw

## 2025-07-12 ENCOUNTER — PATIENT MESSAGE (OUTPATIENT)
Dept: PRIMARY CARE | Facility: CLINIC | Age: 35
End: 2025-07-12
Payer: COMMERCIAL

## 2025-07-12 DIAGNOSIS — R21 RASH: Primary | ICD-10-CM

## 2025-07-13 RX ORDER — TRIAMCINOLONE ACETONIDE 1 MG/G
CREAM TOPICAL 2 TIMES DAILY
Qty: 30 G | Refills: 0 | Status: SHIPPED | OUTPATIENT
Start: 2025-07-13 | End: 2025-07-14 | Stop reason: SDUPTHER

## 2025-07-13 RX ORDER — TRIAMCINOLONE ACETONIDE 1 MG/G
CREAM TOPICAL 2 TIMES DAILY
Qty: 30 G | Refills: 0 | Status: SHIPPED | OUTPATIENT
Start: 2025-07-13 | End: 2025-07-13 | Stop reason: SDUPTHER

## 2025-07-14 RX ORDER — TRIAMCINOLONE ACETONIDE 1 MG/G
CREAM TOPICAL 2 TIMES DAILY
Qty: 30 G | Refills: 0 | Status: SHIPPED | OUTPATIENT
Start: 2025-07-14

## 2025-07-14 RX ORDER — TRIAMCINOLONE ACETONIDE 1 MG/G
OINTMENT TOPICAL 2 TIMES DAILY PRN
Qty: 60 G | Refills: 0 | Status: SHIPPED | OUTPATIENT
Start: 2025-07-14 | End: 2025-11-11

## 2025-07-23 ENCOUNTER — APPOINTMENT (OUTPATIENT)
Dept: PRIMARY CARE | Facility: CLINIC | Age: 35
End: 2025-07-23
Payer: COMMERCIAL

## 2025-07-23 VITALS
DIASTOLIC BLOOD PRESSURE: 80 MMHG | HEART RATE: 78 BPM | HEIGHT: 69 IN | BODY MASS INDEX: 36.73 KG/M2 | SYSTOLIC BLOOD PRESSURE: 110 MMHG | WEIGHT: 248 LBS

## 2025-07-23 DIAGNOSIS — E66.01 MORBID OBESITY (MULTI): Primary | ICD-10-CM

## 2025-07-23 DIAGNOSIS — R00.0 TACHYCARDIA: ICD-10-CM

## 2025-07-23 DIAGNOSIS — R00.2 PALPITATIONS: ICD-10-CM

## 2025-07-23 DIAGNOSIS — M10.9 GOUT, UNSPECIFIED CAUSE, UNSPECIFIED CHRONICITY, UNSPECIFIED SITE: ICD-10-CM

## 2025-07-23 PROBLEM — Z78.9 FALSE POSITIVE HIV SEROLOGY: Status: RESOLVED | Noted: 2025-02-17 | Resolved: 2025-07-23

## 2025-07-23 PROCEDURE — 1036F TOBACCO NON-USER: CPT | Performed by: CLINICAL NURSE SPECIALIST

## 2025-07-23 PROCEDURE — 3008F BODY MASS INDEX DOCD: CPT | Performed by: CLINICAL NURSE SPECIALIST

## 2025-07-23 PROCEDURE — 99213 OFFICE O/P EST LOW 20 MIN: CPT | Performed by: CLINICAL NURSE SPECIALIST

## 2025-07-23 ASSESSMENT — ENCOUNTER SYMPTOMS
HEMATURIA: 0
EYE PAIN: 0
CONSTIPATION: 0
SEIZURES: 0
FATIGUE: 0
VOMITING: 0
WHEEZING: 0
SLEEP DISTURBANCE: 0
COUGH: 0
PALPITATIONS: 0
BLOOD IN STOOL: 0
ABDOMINAL PAIN: 0
NAUSEA: 0
TROUBLE SWALLOWING: 0
PHOTOPHOBIA: 0
CHILLS: 0
SORE THROAT: 0
POLYDIPSIA: 0
BRUISES/BLEEDS EASILY: 0
WOUND: 0
MYALGIAS: 0
SHORTNESS OF BREATH: 0
LOSS OF SENSATION IN FEET: 0
OCCASIONAL FEELINGS OF UNSTEADINESS: 0
DIZZINESS: 0
UNEXPECTED WEIGHT CHANGE: 0
ARTHRALGIAS: 0
BACK PAIN: 0
APPETITE CHANGE: 0
CHEST TIGHTNESS: 0
FLANK PAIN: 0
DEPRESSION: 0
NECK PAIN: 0
JOINT SWELLING: 0
FEVER: 0
ACTIVITY CHANGE: 0
DYSURIA: 0
HEADACHES: 0
CONFUSION: 0
DIARRHEA: 0

## 2025-07-23 ASSESSMENT — PATIENT HEALTH QUESTIONNAIRE - PHQ9
2. FEELING DOWN, DEPRESSED OR HOPELESS: NOT AT ALL
SUM OF ALL RESPONSES TO PHQ9 QUESTIONS 1 AND 2: 0
1. LITTLE INTEREST OR PLEASURE IN DOING THINGS: NOT AT ALL

## 2025-07-23 ASSESSMENT — COLUMBIA-SUICIDE SEVERITY RATING SCALE - C-SSRS
2. HAVE YOU ACTUALLY HAD ANY THOUGHTS OF KILLING YOURSELF?: NO
6. HAVE YOU EVER DONE ANYTHING, STARTED TO DO ANYTHING, OR PREPARED TO DO ANYTHING TO END YOUR LIFE?: NO
1. IN THE PAST MONTH, HAVE YOU WISHED YOU WERE DEAD OR WISHED YOU COULD GO TO SLEEP AND NOT WAKE UP?: NO

## 2025-07-23 NOTE — PROGRESS NOTES
Subjective   Patient ID: Nazia Antonio is a 35 y.o. female who presents for Follow-up (Follow up ).  HPI    Here today as a follow up appointment. Planning to have lab work completed next week.      Following with Endo, Dermatology, and GI.      Gout, controlled with Allopurinol. Denies any recent flare ups.     Continued on Semaglutide. Tolerating currently without side effects.     Review of Systems   Constitutional:  Negative for activity change, appetite change, chills, fatigue, fever and unexpected weight change.   HENT:  Negative for ear pain, hearing loss, nosebleeds, sore throat, tinnitus and trouble swallowing.    Eyes:  Negative for photophobia, pain and visual disturbance.   Respiratory:  Negative for cough, chest tightness, shortness of breath and wheezing.    Cardiovascular:  Negative for chest pain, palpitations and leg swelling.   Gastrointestinal:  Negative for abdominal pain, blood in stool, constipation, diarrhea, nausea and vomiting.   Endocrine: Negative for cold intolerance, heat intolerance, polydipsia and polyuria.   Genitourinary:  Negative for dysuria, flank pain and hematuria.   Musculoskeletal:  Negative for arthralgias, back pain, joint swelling, myalgias and neck pain.   Skin:  Negative for pallor, rash and wound.   Allergic/Immunologic: Negative for immunocompromised state.   Neurological:  Negative for dizziness, seizures and headaches.   Hematological:  Does not bruise/bleed easily.   Psychiatric/Behavioral:  Negative for confusion and sleep disturbance.        Objective   Physical Exam  Vitals and nursing note reviewed.   Constitutional:       General: She is not in acute distress.     Appearance: Normal appearance.   HENT:      Head: Normocephalic.      Nose: Nose normal.     Eyes:      Conjunctiva/sclera: Conjunctivae normal.     Neck:      Vascular: No carotid bruit.     Cardiovascular:      Rate and Rhythm: Normal rate and regular rhythm.      Pulses: Normal pulses.       Heart sounds: Normal heart sounds.   Pulmonary:      Effort: Pulmonary effort is normal.      Breath sounds: Normal breath sounds.   Abdominal:      General: Bowel sounds are normal.      Palpations: Abdomen is soft.     Musculoskeletal:         General: Normal range of motion.      Cervical back: Normal range of motion.     Skin:     General: Skin is warm and dry.     Neurological:      Mental Status: She is alert and oriented to person, place, and time. Mental status is at baseline.     Psychiatric:         Mood and Affect: Mood normal.         Behavior: Behavior normal.         Assessment/Plan       Has an updated order for lab work to have completed. Planning to have done next week.     Gout: Continue Allopurinol. Colchicine PRN.  Empty Sella: Following with Endo for management.   Obesity: BMI: 36.62. Has lost about 32 pounds since last visit. Tolerating Semaglutide. Feels well on current dosage. Considering increase on refill, will notify office if decides.   Palpitations, Tachycardia: No further recurrence.      Declined updated immunizations.   Wellness: January 2025.   Continue to follow with Specialists as previously determined.     Nicole Cornejo, MARY-CNS 07/23/25 8:45 AM

## 2025-08-25 DIAGNOSIS — K21.9 GASTROESOPHAGEAL REFLUX DISEASE WITHOUT ESOPHAGITIS: ICD-10-CM

## 2025-08-25 RX ORDER — PANTOPRAZOLE SODIUM 20 MG/1
20 TABLET, DELAYED RELEASE ORAL DAILY
Qty: 90 TABLET | Refills: 0 | Status: SHIPPED | OUTPATIENT
Start: 2025-08-25

## 2025-09-12 ENCOUNTER — APPOINTMENT (OUTPATIENT)
Facility: CLINIC | Age: 35
End: 2025-09-12
Payer: COMMERCIAL

## 2026-01-23 ENCOUNTER — APPOINTMENT (OUTPATIENT)
Dept: PRIMARY CARE | Facility: CLINIC | Age: 36
End: 2026-01-23
Payer: COMMERCIAL

## 2026-07-01 ENCOUNTER — APPOINTMENT (OUTPATIENT)
Dept: ENDOCRINOLOGY | Facility: CLINIC | Age: 36
End: 2026-07-01
Payer: COMMERCIAL